# Patient Record
Sex: MALE | Race: WHITE | NOT HISPANIC OR LATINO | URBAN - METROPOLITAN AREA
[De-identification: names, ages, dates, MRNs, and addresses within clinical notes are randomized per-mention and may not be internally consistent; named-entity substitution may affect disease eponyms.]

---

## 2017-07-25 ENCOUNTER — OFFICE (OUTPATIENT)
Dept: URBAN - METROPOLITAN AREA CLINIC 33 | Facility: CLINIC | Age: 62
End: 2017-07-25

## 2017-07-25 VITALS
SYSTOLIC BLOOD PRESSURE: 142 MMHG | DIASTOLIC BLOOD PRESSURE: 84 MMHG | WEIGHT: 220 LBS | TEMPERATURE: 97.2 F | HEIGHT: 71 IN | HEART RATE: 62 BPM

## 2017-07-25 DIAGNOSIS — K51.00 ULCERATIVE (CHRONIC) PANCOLITIS WITHOUT COMPLICATIONS: ICD-10-CM

## 2017-07-25 DIAGNOSIS — Z79.899 OTHER LONG TERM (CURRENT) DRUG THERAPY: ICD-10-CM

## 2017-07-25 PROCEDURE — 99213 OFFICE O/P EST LOW 20 MIN: CPT

## 2017-07-25 PROCEDURE — 00031: CPT

## 2017-11-15 ENCOUNTER — ON CAMPUS - OUTPATIENT (OUTPATIENT)
Dept: URBAN - METROPOLITAN AREA HOSPITAL 63 | Facility: HOSPITAL | Age: 62
End: 2017-11-15
Payer: OTHER GOVERNMENT

## 2017-11-15 DIAGNOSIS — K63.5 POLYP OF COLON: ICD-10-CM

## 2017-11-15 DIAGNOSIS — Z87.19 PERSONAL HISTORY OF OTHER DISEASES OF THE DIGESTIVE SYSTEM: ICD-10-CM

## 2017-11-15 PROCEDURE — 45380 COLONOSCOPY AND BIOPSY: CPT

## 2018-01-31 ENCOUNTER — OFFICE (OUTPATIENT)
Dept: URBAN - METROPOLITAN AREA CLINIC 33 | Facility: CLINIC | Age: 63
End: 2018-01-31

## 2018-01-31 VITALS
WEIGHT: 229 LBS | HEART RATE: 46 BPM | HEIGHT: 71 IN | TEMPERATURE: 97.8 F | DIASTOLIC BLOOD PRESSURE: 75 MMHG | SYSTOLIC BLOOD PRESSURE: 121 MMHG

## 2018-01-31 DIAGNOSIS — K51.00 ULCERATIVE (CHRONIC) PANCOLITIS WITHOUT COMPLICATIONS: ICD-10-CM

## 2018-01-31 PROCEDURE — 99213 OFFICE O/P EST LOW 20 MIN: CPT

## 2019-04-11 ENCOUNTER — OFFICE (OUTPATIENT)
Dept: URBAN - METROPOLITAN AREA CLINIC 33 | Facility: CLINIC | Age: 64
End: 2019-04-11
Payer: OTHER GOVERNMENT

## 2019-04-11 VITALS
SYSTOLIC BLOOD PRESSURE: 135 MMHG | DIASTOLIC BLOOD PRESSURE: 85 MMHG | WEIGHT: 223 LBS | HEIGHT: 71 IN | HEART RATE: 53 BPM | TEMPERATURE: 97.9 F

## 2019-04-11 DIAGNOSIS — K51.00 ULCERATIVE (CHRONIC) PANCOLITIS WITHOUT COMPLICATIONS: ICD-10-CM

## 2019-04-11 DIAGNOSIS — Z79.899 OTHER LONG TERM (CURRENT) DRUG THERAPY: ICD-10-CM

## 2019-04-11 PROCEDURE — 99213 OFFICE O/P EST LOW 20 MIN: CPT

## 2020-04-23 ENCOUNTER — OFFICE (OUTPATIENT)
Dept: URBAN - METROPOLITAN AREA CLINIC 34 | Facility: CLINIC | Age: 65
End: 2020-04-23
Payer: OTHER GOVERNMENT

## 2020-04-23 VITALS — HEIGHT: 71 IN | WEIGHT: 210 LBS

## 2020-04-23 DIAGNOSIS — Z79.899 OTHER LONG TERM (CURRENT) DRUG THERAPY: ICD-10-CM

## 2020-04-23 DIAGNOSIS — K51.00 ULCERATIVE (CHRONIC) PANCOLITIS WITHOUT COMPLICATIONS: ICD-10-CM

## 2020-04-23 PROCEDURE — 99213 OFFICE O/P EST LOW 20 MIN: CPT | Mod: 95 | Performed by: INTERNAL MEDICINE

## 2020-04-23 NOTE — SERVICEHPINOTES
PATIENT VERIFIED BY DATE OF BIRTH AND NAME. Patient has been consented for this telecommunication visit.Pt returns for routine follow up of UC. He continues to do quite well and continues on lialda 2 capsules per day. On this regimen he usually has 1-2 BM/day. Normal consistency, sometimes on the softer side. No blood in stool itself, rarely sees blood with wiping due to known hemorrhoids. No mucous in stools. No urgency or tenesmus. No abdominal pain or cramping. No nocturnal BMs. No unusual or unexplained rashes, low back pain, arthritic complaints, or eye complaints. Colonoscopy 11/2017 showed 2 non-adenomatous polyps, mild diverticulosis, and internal hemorrhoids. The colonic mucosa was otherwise normal appearing, and random colon biopsies were all normal. Last colonoscopy prior to 11/2017 (in 2014) showed pseudopolyps and diverticulosis surveillance biopsies confirmed pseudopolyps but no active colitis. Routine labs with PCP have been normal.He recently quit smoking (on his birthday) and is somewhat nervous as last time he quit he had a substantial flare. Otherwise, he feels well and has no complaints or issues. ROS as follows:BR  Pertinent negative symptoms include eye irritation, sneezing, palpitation/fluttering of heart, shortness of breath while exercising, blurred vision, irritation from light, painful eyes, constipation, diarrhea, pain, reflux (heartburn), rectal bleeding, night sweats, weight loss, dry skin, itchy skin, rash, fainting, ringing in ears, cough, wheezing.

## 2021-02-03 ENCOUNTER — CONSULT (OUTPATIENT)
Dept: CARDIOLOGY | Facility: CLINIC | Age: 66
End: 2021-02-03

## 2021-02-03 VITALS
BODY MASS INDEX: 30.38 KG/M2 | SYSTOLIC BLOOD PRESSURE: 102 MMHG | WEIGHT: 217 LBS | HEART RATE: 53 BPM | HEIGHT: 71 IN | DIASTOLIC BLOOD PRESSURE: 60 MMHG

## 2021-02-03 DIAGNOSIS — I10 ESSENTIAL HYPERTENSION: ICD-10-CM

## 2021-02-03 DIAGNOSIS — R06.83 SNORING: ICD-10-CM

## 2021-02-03 DIAGNOSIS — E66.9 OBESITY (BMI 30-39.9): ICD-10-CM

## 2021-02-03 DIAGNOSIS — I48.0 PAF (PAROXYSMAL ATRIAL FIBRILLATION) (HCC): Primary | ICD-10-CM

## 2021-02-03 DIAGNOSIS — E78.49 OTHER HYPERLIPIDEMIA: ICD-10-CM

## 2021-02-03 PROBLEM — K51.811 OTHER ULCERATIVE COLITIS WITH RECTAL BLEEDING (HCC): Status: ACTIVE | Noted: 2021-02-03

## 2021-02-03 PROCEDURE — 99204 OFFICE O/P NEW MOD 45 MIN: CPT | Performed by: INTERNAL MEDICINE

## 2021-02-03 PROCEDURE — 93000 ELECTROCARDIOGRAM COMPLETE: CPT | Performed by: INTERNAL MEDICINE

## 2021-02-03 RX ORDER — ERGOCALCIFEROL 1.25 MG/1
50000 CAPSULE ORAL 2 TIMES WEEKLY
COMMUNITY
End: 2023-01-12

## 2021-02-03 RX ORDER — MULTIVIT WITH MINERALS/LUTEIN
1000 TABLET ORAL DAILY
COMMUNITY

## 2021-02-03 RX ORDER — FOLIC ACID 1 MG/1
1 TABLET ORAL DAILY
COMMUNITY

## 2021-02-03 RX ORDER — ROSUVASTATIN CALCIUM 10 MG/1
10 TABLET, COATED ORAL NIGHTLY
COMMUNITY

## 2021-02-03 RX ORDER — NEBIVOLOL 5 MG/1
5 TABLET ORAL NIGHTLY
COMMUNITY
End: 2021-11-01

## 2021-02-03 RX ORDER — NEBIVOLOL 20 MG/1
20 TABLET ORAL EVERY MORNING
Status: ON HOLD | COMMUNITY
End: 2021-09-23

## 2021-02-03 RX ORDER — MESALAMINE 1.2 G/1
1200 TABLET, DELAYED RELEASE ORAL 4 TIMES DAILY
Status: ON HOLD | COMMUNITY
End: 2021-09-23

## 2021-02-03 RX ORDER — MONTELUKAST SODIUM 10 MG/1
10 TABLET ORAL NIGHTLY
COMMUNITY
End: 2023-01-12

## 2021-02-03 RX ORDER — LOSARTAN POTASSIUM 100 MG/1
100 TABLET ORAL DAILY
COMMUNITY
End: 2021-09-25 | Stop reason: HOSPADM

## 2021-02-03 RX ORDER — PYRIDOXINE HCL (VITAMIN B6) 50 MG
50 TABLET ORAL DAILY
COMMUNITY

## 2021-02-03 NOTE — PROGRESS NOTES
[facility] Cardiology    Subjective:     Encounter Date:02/03/2021     Patient ID: Oskar Jimenes is a 65 y.o. male.  Referring provider: Self Referring     Reason for consultation:   Chief Complaint   Patient presents with   • Atrial Fibrillation   • Irregular Heart Beat        PROBLEM LIST:  1. Atrial fibrillation  a. Diagnosed 2018 after TIA.   b. Cardiolite stress test 5/10/18: no ischemia, EF 63%. OSH.  c. Echo 5/3/18: EF 60%, mild LVH, LA mildly dilated. No sig valvular disease. Dilated IVC. OSH.   d. CHADSVASC = 4, on Xarelto.   2. Hypertension  3. Hyperlipidemia  4. CVA  a. Carotid artery duplex 2018: No sig stenosis bilaterally.   b. MR Brain 2018: two foci of acute focal cortical infarcts on the right side  5. Ulcerative colitis  a. Diagnosed around 2005.   b. Followed by Dr. José Miguel Corbin   6. Former tobacco use  a. Quit 4/2020.      HPI:  Oskar Jimenes is a 65 y.o. male who is seen in consultation today at the request of Self Referring for PAF. He was following with cardiology annually. He moved from [place] this past fall and is looking to establish with cardiology here. He was diagnosed with Afib after TIA/CVA in 2018. He was started on Xarelto and Bystolic. He has done well on this. He has been paroxsymal. Symptoms are skipped beats. Otherwise, he is asymptomatic. Denies fatigue, chest pain, shortness of breath.      He has never been diagnosed with sleep apnea. He snores and has apneic spells per his wife.     He is followed by Dr. Corbin (GI) for UC. He had a c-scope last week which was essentially benign, biopsies pending. Continues to report hematochezia.     Medications and Allergies:    No Known Allergies      Current Outpatient Medications:   •  ascorbic acid (VITAMIN C) 1000 MG tablet, Take 1,000 mg by mouth Daily., Disp: , Rfl:   •  folic acid (FOLVITE) 1 MG tablet, Take 1 mg by mouth Daily., Disp: , Rfl:   •  losartan (COZAAR) 100 MG tablet, Take 100 mg by  mouth Daily., Disp: , Rfl:   •  Magnesium 70 MG capsule, Take 70 mg by mouth Daily., Disp: , Rfl:   •  mesalamine (LIALDA) 1.2 g EC tablet, Take 1,200 mg by mouth 4 (Four) Times a Day., Disp: , Rfl:   •  metFORMIN (GLUCOPHAGE) 1000 MG tablet, Take 1,000 mg by mouth Daily With Breakfast., Disp: , Rfl:   •  montelukast (SINGULAIR) 10 MG tablet, Take 10 mg by mouth Every Night., Disp: , Rfl:   •  nebivolol (BYSTOLIC) 20 MG tablet, Take 20 mg by mouth Daily., Disp: , Rfl:   •  nebivolol (BYSTOLIC) 5 MG tablet, Take 5 mg by mouth Daily., Disp: , Rfl:   •  pyridoxine (VITAMIN B-6) 50 MG tablet, Take 50 mg by mouth Daily., Disp: , Rfl:   •  rivaroxaban (XARELTO) 20 MG tablet, Take 20 mg by mouth Daily., Disp: , Rfl:   •  rosuvastatin (CRESTOR) 10 MG tablet, Take 10 mg by mouth Daily., Disp: , Rfl:   •  thiamine (VITAMIN B-1) 100 MG tablet  tablet, Take 50 mg by mouth Daily., Disp: , Rfl:   •  vitamin D (ERGOCALCIFEROL) 1.25 MG (33455 UT) capsule capsule, Take 50,000 Units by mouth 2 (Two) Times a Week., Disp: , Rfl:     Social History:  Social History     Tobacco Use   • Smoking status: Former Smoker     Packs/day: 0.50     Types: Cigarettes     Quit date: 2020     Years since quittin.7   • Smokeless tobacco: Never Used   Substance Use Topics   • Alcohol use: Yes     Alcohol/week: 2.0 - 3.0 standard drinks     Types: 2 - 3 Cans of beer per week     Frequency: Never     Drinks per session: 1 or 2        Family History:  family history includes Heart disease in his father; Heart failure in his father and mother; Kidney failure in his father.     Review of Systems: The following portions of the patient's history were reviewed and updated as appropriate: allergies, current medications and problem list.    Constitution: Negative for chills, fatigue, fever, and generalized weakness.   Cardiovascular: See HPI  Respiratory: See HPI  HENT: Negative for ear pain, nosebleeds, and tinnitus.  Gastrointestinal: Negative for  abdominal pain, constipation, diarrhea, nausea and vomiting.   Genitourinary: No urinary symptoms.   Musculoskeletal: Negative for muscle cramps.  Neurological: Negative for dizziness, headaches, loss of balance, numbness, and symptoms of stroke.  Psychiatric: Negative for depression, anxiety.         Objective:     Vitals:    02/03/21 0940   BP: 102/60   Pulse: 53       GENERAL: This is a well-developed, well-nourished, male who is in no acute distress.   SKIN: Pink and warm without rash or abnormality noted.   HEENT: Head is normocephalic and atraumatic. Pupils are equal and reactive to light bilaterally. Mucous membranes are pink and moist.   NECK: Supple without lymphadenopathy or thyromegaly. There is no jugular venous distention at 30°.  LUNGS: Clear to auscultation bilaterally without wheezing, rhonchi, or rales noted.   CARDIOVASCULAR: The heart has an irregularly irregular rhythm, bradycardic. Normal S1 and S2. There is no murmur, gallop, rub, or click appreciated. The PMI is nondisplaced. Carotid upstrokes are 2+ and symmetrical without bruits.   ABDOMEN: Soft and nondistended with positive bowel sounds x4. The patient admits lower abdominal discomfort on palpitation.   MUSCULOSKELETAL: There are no obvious bony abnormalities. Normal range of tenderness to palpation.   NEUROLOGICAL: Nonfocal. Alert and oriented x3.   PERIPHERAL VASCULAR: Femoral pulses are 2+ and symmetrical without bruits. Posterior tibial and dorsalis pedis pulses are 2+ and symmetrical. There is no peripheral edema.   PSYCH: Normal mood and affect.       ECG 12 Lead    Date/Time: 2/3/2021 10:11 AM  Performed by: Christina Mcgrath APRN  Authorized by: Christina Mcgrath APRN   Comparison: compared with previous ECG   Rhythm: atrial fibrillation  BPM: 53    Clinical impression: abnormal EKG                ASSESSMENT       ICD-10-CM ICD-9-CM   1. PAF (paroxysmal atrial fibrillation) ([facility])  I48.0 427.31   2. Essential  hypertension  I10 401.9   3. Other hyperlipidemia  E78.49 272.4   4. Obesity (BMI 30-39.9)  E66.9 278.00   5. Snoring  R06.83 786.09            PLAN     1. Continue Xarelto for stroke prevention  2. Continue Bystolic for rate-control. Discussed option of rhythm control with patient but he favors rate control since he is asymptomatic in Afib.   3. Continue Bystolic, losartan for HTN.   4. Continue Crestor for HLD.   5. Sleep study for snoring.  6. Discussed Watchman Device with patient given his history of UC, hematochezia. Patient will keep this in mind and discuss with his gastroenterologist.   7. Patient was counseled to begin aerobic exercise 30 min per day for at least 4 days per week.   8. Follow-up in 6 months, sooner as needed.      Scribed for Caitlyn Cardenas MD by ARCHANA Mcgrath, ELADIA. 2/3/2021  10:11 EST     I Caitlyn Cardenas MD personally performed the services described in this documentation as scribed by the above individual in my presence, and it is both accurate and complete.    Caitlyn Cardenas MD, FACC

## 2021-09-22 ENCOUNTER — APPOINTMENT (OUTPATIENT)
Dept: CT IMAGING | Facility: HOSPITAL | Age: 66
End: 2021-09-22

## 2021-09-22 ENCOUNTER — HOSPITAL ENCOUNTER (INPATIENT)
Facility: HOSPITAL | Age: 66
LOS: 3 days | Discharge: HOME | End: 2021-09-25
Attending: EMERGENCY MEDICINE | Admitting: INTERNAL MEDICINE

## 2021-09-22 DIAGNOSIS — K51.911 ULCERATIVE COLITIS WITH RECTAL BLEEDING, UNSPECIFIED LOCATION (HCC): ICD-10-CM

## 2021-09-22 DIAGNOSIS — K51.811 OTHER ULCERATIVE COLITIS WITH RECTAL BLEEDING (HCC): ICD-10-CM

## 2021-09-22 DIAGNOSIS — D64.9 SYMPTOMATIC ANEMIA: Primary | ICD-10-CM

## 2021-09-22 LAB
ABO GROUP BLD: NORMAL
ABO GROUP BLD: NORMAL
ALBUMIN SERPL-MCNC: 2.9 G/DL (ref 3.5–5.2)
ALBUMIN/GLOB SERPL: 1.1 G/DL
ALP SERPL-CCNC: 47 U/L (ref 39–117)
ALT SERPL W P-5'-P-CCNC: 23 U/L (ref 1–41)
ANION GAP SERPL CALCULATED.3IONS-SCNC: 12 MMOL/L (ref 5–15)
APTT PPP: 28.1 SECONDS (ref 22–39)
AST SERPL-CCNC: 15 U/L (ref 1–40)
BACTERIA UR QL AUTO: ABNORMAL /HPF
BASOPHILS # BLD MANUAL: 0 10*3/MM3 (ref 0–0.2)
BASOPHILS NFR BLD AUTO: 0 % (ref 0–1.5)
BILIRUB SERPL-MCNC: 0.2 MG/DL (ref 0–1.2)
BILIRUB UR QL STRIP: NEGATIVE
BLD GP AB SCN SERPL QL: NEGATIVE
BUN SERPL-MCNC: 12 MG/DL (ref 8–23)
BUN/CREAT SERPL: 11.3 (ref 7–25)
CALCIUM SPEC-SCNC: 8.2 MG/DL (ref 8.6–10.5)
CHLORIDE SERPL-SCNC: 101 MMOL/L (ref 98–107)
CLARITY UR: CLEAR
CO2 SERPL-SCNC: 23 MMOL/L (ref 22–29)
COLOR UR: YELLOW
CREAT SERPL-MCNC: 1.06 MG/DL (ref 0.76–1.27)
CRP SERPL-MCNC: 2.22 MG/DL (ref 0–0.5)
DEPRECATED RDW RBC AUTO: 53.7 FL (ref 37–54)
DEVELOPER EXPIRATION DATE: ABNORMAL
DEVELOPER LOT NUMBER: ABNORMAL
EOSINOPHIL # BLD MANUAL: 0.09 10*3/MM3 (ref 0–0.4)
EOSINOPHIL NFR BLD MANUAL: 1 % (ref 0.3–6.2)
ERYTHROCYTE [DISTWIDTH] IN BLOOD BY AUTOMATED COUNT: 15.2 % (ref 12.3–15.4)
ERYTHROCYTE [SEDIMENTATION RATE] IN BLOOD: 18 MM/HR (ref 0–20)
EXPIRATION DATE: ABNORMAL
FECAL OCCULT BLOOD SCREEN, POC: POSITIVE
FERRITIN SERPL-MCNC: 37.43 NG/ML (ref 30–400)
GFR SERPL CREATININE-BSD FRML MDRD: 70 ML/MIN/1.73
GLOBULIN UR ELPH-MCNC: 2.7 GM/DL
GLUCOSE SERPL-MCNC: 114 MG/DL (ref 65–99)
GLUCOSE UR STRIP-MCNC: NEGATIVE MG/DL
HCT VFR BLD AUTO: 18.1 % (ref 37.5–51)
HGB BLD-MCNC: 5.6 G/DL (ref 13–17.7)
HGB UR QL STRIP.AUTO: NEGATIVE
HYALINE CASTS UR QL AUTO: ABNORMAL /LPF
INR PPP: 1.48 (ref 0.85–1.16)
IRON 24H UR-MRATE: 12 MCG/DL (ref 59–158)
IRON SATN MFR SERPL: 4 % (ref 20–50)
KETONES UR QL STRIP: ABNORMAL
LEUKOCYTE ESTERASE UR QL STRIP.AUTO: ABNORMAL
LYMPHOCYTES # BLD MANUAL: 0.94 10*3/MM3 (ref 0.7–3.1)
LYMPHOCYTES NFR BLD MANUAL: 10 % (ref 19.6–45.3)
LYMPHOCYTES NFR BLD MANUAL: 5 % (ref 5–12)
Lab: ABNORMAL
MAGNESIUM SERPL-MCNC: 1.7 MG/DL (ref 1.6–2.4)
MCH RBC QN AUTO: 30.6 PG (ref 26.6–33)
MCHC RBC AUTO-ENTMCNC: 30.9 G/DL (ref 31.5–35.7)
MCV RBC AUTO: 98.9 FL (ref 79–97)
METAMYELOCYTES NFR BLD MANUAL: 1 % (ref 0–0)
MONOCYTES # BLD AUTO: 0.47 10*3/MM3 (ref 0.1–0.9)
NEGATIVE CONTROL: NEGATIVE
NEUTROPHILS # BLD AUTO: 7.83 10*3/MM3 (ref 1.7–7)
NEUTROPHILS NFR BLD MANUAL: 50 % (ref 42.7–76)
NEUTS BAND NFR BLD MANUAL: 33 % (ref 0–5)
NEUTS VAC BLD QL SMEAR: ABNORMAL
NITRITE UR QL STRIP: NEGATIVE
NRBC SPEC MANUAL: 1 /100 WBC (ref 0–0.2)
PH UR STRIP.AUTO: 5.5 [PH] (ref 5–8)
PLAT MORPH BLD: NORMAL
PLATELET # BLD AUTO: 610 10*3/MM3 (ref 140–450)
PMV BLD AUTO: 8.3 FL (ref 6–12)
POSITIVE CONTROL: POSITIVE
POTASSIUM SERPL-SCNC: 4 MMOL/L (ref 3.5–5.2)
PROCALCITONIN SERPL-MCNC: 0.11 NG/ML (ref 0–0.25)
PROT SERPL-MCNC: 5.6 G/DL (ref 6–8.5)
PROT UR QL STRIP: ABNORMAL
PROTHROMBIN TIME: 17.4 SECONDS (ref 11.4–14.4)
RBC # BLD AUTO: 1.83 10*6/MM3 (ref 4.14–5.8)
RBC # UR: ABNORMAL /HPF
RBC MORPH BLD: NORMAL
REF LAB TEST METHOD: ABNORMAL
RETICS # AUTO: 0.1 10*6/MM3 (ref 0.02–0.13)
RETICS/RBC NFR AUTO: 5.73 % (ref 0.7–1.9)
RH BLD: NEGATIVE
RH BLD: NEGATIVE
SARS-COV-2 RNA RESP QL NAA+PROBE: NOT DETECTED
SODIUM SERPL-SCNC: 136 MMOL/L (ref 136–145)
SP GR UR STRIP: 1.02 (ref 1–1.03)
SQUAMOUS #/AREA URNS HPF: ABNORMAL /HPF
T&S EXPIRATION DATE: NORMAL
TIBC SERPL-MCNC: 277 MCG/DL (ref 298–536)
TOXIC GRANULATION: ABNORMAL
TRANSFERRIN SERPL-MCNC: 186 MG/DL (ref 200–360)
UROBILINOGEN UR QL STRIP: ABNORMAL
WBC # BLD AUTO: 9.43 10*3/MM3 (ref 3.4–10.8)
WBC UR QL AUTO: ABNORMAL /HPF

## 2021-09-22 PROCEDURE — 86901 BLOOD TYPING SEROLOGIC RH(D): CPT | Performed by: EMERGENCY MEDICINE

## 2021-09-22 PROCEDURE — 85610 PROTHROMBIN TIME: CPT | Performed by: EMERGENCY MEDICINE

## 2021-09-22 PROCEDURE — 84466 ASSAY OF TRANSFERRIN: CPT | Performed by: INTERNAL MEDICINE

## 2021-09-22 PROCEDURE — 85652 RBC SED RATE AUTOMATED: CPT | Performed by: INTERNAL MEDICINE

## 2021-09-22 PROCEDURE — 82270 OCCULT BLOOD FECES: CPT | Performed by: EMERGENCY MEDICINE

## 2021-09-22 PROCEDURE — 84145 PROCALCITONIN (PCT): CPT | Performed by: INTERNAL MEDICINE

## 2021-09-22 PROCEDURE — 85018 HEMOGLOBIN: CPT | Performed by: INTERNAL MEDICINE

## 2021-09-22 PROCEDURE — 86900 BLOOD TYPING SEROLOGIC ABO: CPT | Performed by: EMERGENCY MEDICINE

## 2021-09-22 PROCEDURE — 85014 HEMATOCRIT: CPT | Performed by: INTERNAL MEDICINE

## 2021-09-22 PROCEDURE — 86923 COMPATIBILITY TEST ELECTRIC: CPT

## 2021-09-22 PROCEDURE — 86140 C-REACTIVE PROTEIN: CPT | Performed by: INTERNAL MEDICINE

## 2021-09-22 PROCEDURE — 85730 THROMBOPLASTIN TIME PARTIAL: CPT | Performed by: EMERGENCY MEDICINE

## 2021-09-22 PROCEDURE — 82728 ASSAY OF FERRITIN: CPT | Performed by: INTERNAL MEDICINE

## 2021-09-22 PROCEDURE — 86850 RBC ANTIBODY SCREEN: CPT | Performed by: EMERGENCY MEDICINE

## 2021-09-22 PROCEDURE — U0003 INFECTIOUS AGENT DETECTION BY NUCLEIC ACID (DNA OR RNA); SEVERE ACUTE RESPIRATORY SYNDROME CORONAVIRUS 2 (SARS-COV-2) (CORONAVIRUS DISEASE [COVID-19]), AMPLIFIED PROBE TECHNIQUE, MAKING USE OF HIGH THROUGHPUT TECHNOLOGIES AS DESCRIBED BY CMS-2020-01-R: HCPCS | Performed by: INTERNAL MEDICINE

## 2021-09-22 PROCEDURE — 85045 AUTOMATED RETICULOCYTE COUNT: CPT | Performed by: INTERNAL MEDICINE

## 2021-09-22 PROCEDURE — U0005 INFEC AGEN DETEC AMPLI PROBE: HCPCS | Performed by: INTERNAL MEDICINE

## 2021-09-22 PROCEDURE — 86901 BLOOD TYPING SEROLOGIC RH(D): CPT

## 2021-09-22 PROCEDURE — 80053 COMPREHEN METABOLIC PANEL: CPT | Performed by: EMERGENCY MEDICINE

## 2021-09-22 PROCEDURE — 83540 ASSAY OF IRON: CPT | Performed by: INTERNAL MEDICINE

## 2021-09-22 PROCEDURE — 99284 EMERGENCY DEPT VISIT MOD MDM: CPT

## 2021-09-22 PROCEDURE — 86900 BLOOD TYPING SEROLOGIC ABO: CPT

## 2021-09-22 PROCEDURE — 74177 CT ABD & PELVIS W/CONTRAST: CPT

## 2021-09-22 PROCEDURE — 99223 1ST HOSP IP/OBS HIGH 75: CPT | Performed by: INTERNAL MEDICINE

## 2021-09-22 PROCEDURE — 85025 COMPLETE CBC W/AUTO DIFF WBC: CPT | Performed by: EMERGENCY MEDICINE

## 2021-09-22 PROCEDURE — 83735 ASSAY OF MAGNESIUM: CPT | Performed by: INTERNAL MEDICINE

## 2021-09-22 PROCEDURE — 81001 URINALYSIS AUTO W/SCOPE: CPT | Performed by: EMERGENCY MEDICINE

## 2021-09-22 PROCEDURE — P9016 RBC LEUKOCYTES REDUCED: HCPCS

## 2021-09-22 PROCEDURE — 36430 TRANSFUSION BLD/BLD COMPNT: CPT

## 2021-09-22 PROCEDURE — 93005 ELECTROCARDIOGRAM TRACING: CPT | Performed by: INTERNAL MEDICINE

## 2021-09-22 PROCEDURE — 85007 BL SMEAR W/DIFF WBC COUNT: CPT | Performed by: EMERGENCY MEDICINE

## 2021-09-22 RX ORDER — PANTOPRAZOLE SODIUM 40 MG/10ML
40 INJECTION, POWDER, LYOPHILIZED, FOR SOLUTION INTRAVENOUS EVERY 12 HOURS SCHEDULED
Status: DISCONTINUED | OUTPATIENT
Start: 2021-09-22 | End: 2021-09-24

## 2021-09-22 RX ORDER — ACETAMINOPHEN 160 MG/5ML
650 SOLUTION ORAL EVERY 4 HOURS PRN
Status: DISCONTINUED | OUTPATIENT
Start: 2021-09-22 | End: 2021-09-25 | Stop reason: HOSPADM

## 2021-09-22 RX ORDER — SODIUM CHLORIDE 0.9 % (FLUSH) 0.9 %
10 SYRINGE (ML) INJECTION AS NEEDED
Status: DISCONTINUED | OUTPATIENT
Start: 2021-09-22 | End: 2021-09-25 | Stop reason: HOSPADM

## 2021-09-22 RX ORDER — SODIUM CHLORIDE, SODIUM LACTATE, POTASSIUM CHLORIDE, CALCIUM CHLORIDE 600; 310; 30; 20 MG/100ML; MG/100ML; MG/100ML; MG/100ML
75 INJECTION, SOLUTION INTRAVENOUS CONTINUOUS
Status: ACTIVE | OUTPATIENT
Start: 2021-09-22 | End: 2021-09-23

## 2021-09-22 RX ORDER — MONTELUKAST SODIUM 10 MG/1
10 TABLET ORAL NIGHTLY
Status: DISCONTINUED | OUTPATIENT
Start: 2021-09-22 | End: 2021-09-25 | Stop reason: HOSPADM

## 2021-09-22 RX ORDER — ACETAMINOPHEN 650 MG/1
650 SUPPOSITORY RECTAL EVERY 4 HOURS PRN
Status: DISCONTINUED | OUTPATIENT
Start: 2021-09-22 | End: 2021-09-25 | Stop reason: HOSPADM

## 2021-09-22 RX ORDER — MESALAMINE 1.2 G/1
1.2 TABLET, DELAYED RELEASE ORAL 4 TIMES DAILY
Status: DISCONTINUED | OUTPATIENT
Start: 2021-09-22 | End: 2021-09-24

## 2021-09-22 RX ORDER — FOLIC ACID 1 MG/1
1 TABLET ORAL DAILY
Status: DISCONTINUED | OUTPATIENT
Start: 2021-09-23 | End: 2021-09-25 | Stop reason: HOSPADM

## 2021-09-22 RX ORDER — ACETAMINOPHEN 325 MG/1
650 TABLET ORAL EVERY 4 HOURS PRN
Status: DISCONTINUED | OUTPATIENT
Start: 2021-09-22 | End: 2021-09-25 | Stop reason: HOSPADM

## 2021-09-22 RX ORDER — SODIUM CHLORIDE 0.9 % (FLUSH) 0.9 %
10 SYRINGE (ML) INJECTION EVERY 12 HOURS SCHEDULED
Status: DISCONTINUED | OUTPATIENT
Start: 2021-09-22 | End: 2021-09-25 | Stop reason: HOSPADM

## 2021-09-22 RX ORDER — LANOLIN ALCOHOL/MO/W.PET/CERES
50 CREAM (GRAM) TOPICAL DAILY
Status: DISCONTINUED | OUTPATIENT
Start: 2021-09-23 | End: 2021-09-25 | Stop reason: HOSPADM

## 2021-09-22 RX ORDER — ASCORBIC ACID 500 MG
1000 TABLET ORAL DAILY
Status: DISCONTINUED | OUTPATIENT
Start: 2021-09-23 | End: 2021-09-25 | Stop reason: HOSPADM

## 2021-09-22 RX ORDER — METHYLPREDNISOLONE SODIUM SUCCINATE 40 MG/ML
20 INJECTION, POWDER, LYOPHILIZED, FOR SOLUTION INTRAMUSCULAR; INTRAVENOUS EVERY 8 HOURS SCHEDULED
Status: DISCONTINUED | OUTPATIENT
Start: 2021-09-22 | End: 2021-09-23

## 2021-09-22 RX ORDER — SACCHAROMYCES BOULARDII 250 MG
250 CAPSULE ORAL 2 TIMES DAILY
Status: DISCONTINUED | OUTPATIENT
Start: 2021-09-22 | End: 2021-09-25 | Stop reason: HOSPADM

## 2021-09-22 RX ORDER — ONDANSETRON 2 MG/ML
4 INJECTION INTRAMUSCULAR; INTRAVENOUS EVERY 6 HOURS PRN
Status: DISCONTINUED | OUTPATIENT
Start: 2021-09-22 | End: 2021-09-25 | Stop reason: HOSPADM

## 2021-09-22 RX ORDER — MORPHINE SULFATE 4 MG/ML
3 INJECTION, SOLUTION INTRAMUSCULAR; INTRAVENOUS
Status: DISCONTINUED | OUTPATIENT
Start: 2021-09-22 | End: 2021-09-25 | Stop reason: HOSPADM

## 2021-09-22 RX ORDER — CHOLECALCIFEROL (VITAMIN D3) 125 MCG
5 CAPSULE ORAL NIGHTLY PRN
Status: DISCONTINUED | OUTPATIENT
Start: 2021-09-22 | End: 2021-09-25 | Stop reason: HOSPADM

## 2021-09-22 RX ORDER — ROSUVASTATIN CALCIUM 10 MG/1
10 TABLET, COATED ORAL DAILY
Status: DISCONTINUED | OUTPATIENT
Start: 2021-09-23 | End: 2021-09-25 | Stop reason: HOSPADM

## 2021-09-22 RX ADMIN — IOPAMIDOL 100 ML: 612 INJECTION, SOLUTION INTRAVENOUS at 23:31

## 2021-09-22 RX ADMIN — SODIUM CHLORIDE 500 ML: 9 INJECTION, SOLUTION INTRAVENOUS at 18:38

## 2021-09-22 RX ADMIN — MONTELUKAST SODIUM 10 MG: 10 TABLET, COATED ORAL at 22:55

## 2021-09-22 RX ADMIN — Medication 250 MG: at 22:55

## 2021-09-23 LAB
ADV 40+41 DNA STL QL NAA+NON-PROBE: NOT DETECTED
ANION GAP SERPL CALCULATED.3IONS-SCNC: 11 MMOL/L (ref 5–15)
ASTRO TYP 1-8 RNA STL QL NAA+NON-PROBE: NOT DETECTED
BASOPHILS # BLD AUTO: 0.03 10*3/MM3 (ref 0–0.2)
BASOPHILS NFR BLD AUTO: 0.3 % (ref 0–1.5)
BH BB BLOOD EXPIRATION DATE: NORMAL
BH BB BLOOD EXPIRATION DATE: NORMAL
BH BB BLOOD TYPE BARCODE: 600
BH BB BLOOD TYPE BARCODE: 9500
BH BB DISPENSE STATUS: NORMAL
BH BB DISPENSE STATUS: NORMAL
BH BB PRODUCT CODE: NORMAL
BH BB PRODUCT CODE: NORMAL
BH BB UNIT NUMBER: NORMAL
BH BB UNIT NUMBER: NORMAL
BUN SERPL-MCNC: 13 MG/DL (ref 8–23)
BUN/CREAT SERPL: 14 (ref 7–25)
C CAYETANENSIS DNA STL QL NAA+NON-PROBE: NOT DETECTED
C COLI+JEJ+UPSA DNA STL QL NAA+NON-PROBE: NOT DETECTED
CALCIUM SPEC-SCNC: 7.7 MG/DL (ref 8.6–10.5)
CHLORIDE SERPL-SCNC: 103 MMOL/L (ref 98–107)
CO2 SERPL-SCNC: 21 MMOL/L (ref 22–29)
CREAT SERPL-MCNC: 0.93 MG/DL (ref 0.76–1.27)
CROSSMATCH INTERPRETATION: NORMAL
CROSSMATCH INTERPRETATION: NORMAL
CRYPTOSP DNA STL QL NAA+NON-PROBE: NOT DETECTED
DEPRECATED RDW RBC AUTO: 56.5 FL (ref 37–54)
E HISTOLYT DNA STL QL NAA+NON-PROBE: NOT DETECTED
EAEC PAA PLAS AGGR+AATA ST NAA+NON-PRB: NOT DETECTED
EC STX1+STX2 GENES STL QL NAA+NON-PROBE: NOT DETECTED
EOSINOPHIL # BLD AUTO: 0 10*3/MM3 (ref 0–0.4)
EOSINOPHIL NFR BLD AUTO: 0 % (ref 0.3–6.2)
EPEC EAE GENE STL QL NAA+NON-PROBE: NOT DETECTED
ERYTHROCYTE [DISTWIDTH] IN BLOOD BY AUTOMATED COUNT: 16.7 % (ref 12.3–15.4)
ETEC LTA+ST1A+ST1B TOX ST NAA+NON-PROBE: NOT DETECTED
FOLATE SERPL-MCNC: >20 NG/ML (ref 4.78–24.2)
G LAMBLIA DNA STL QL NAA+NON-PROBE: NOT DETECTED
GFR SERPL CREATININE-BSD FRML MDRD: 81 ML/MIN/1.73
GLUCOSE SERPL-MCNC: 131 MG/DL (ref 65–99)
HBV SURFACE AB SER RIA-ACNC: REACTIVE
HCT VFR BLD AUTO: 21.1 % (ref 37.5–51)
HCT VFR BLD AUTO: 22.2 % (ref 37.5–51)
HCT VFR BLD AUTO: 25.9 % (ref 37.5–51)
HCT VFR BLD AUTO: 28.5 % (ref 37.5–51)
HGB BLD-MCNC: 6.3 G/DL (ref 13–17.7)
HGB BLD-MCNC: 7.1 G/DL (ref 13–17.7)
HGB BLD-MCNC: 8.2 G/DL (ref 13–17.7)
HGB BLD-MCNC: 8.8 G/DL (ref 13–17.7)
IMM GRANULOCYTES # BLD AUTO: 0.16 10*3/MM3 (ref 0–0.05)
IMM GRANULOCYTES NFR BLD AUTO: 1.5 % (ref 0–0.5)
LYMPHOCYTES # BLD AUTO: 0.66 10*3/MM3 (ref 0.7–3.1)
LYMPHOCYTES NFR BLD AUTO: 6.3 % (ref 19.6–45.3)
MAGNESIUM SERPL-MCNC: 1.7 MG/DL (ref 1.6–2.4)
MCH RBC QN AUTO: 30.5 PG (ref 26.6–33)
MCHC RBC AUTO-ENTMCNC: 32 G/DL (ref 31.5–35.7)
MCV RBC AUTO: 95.3 FL (ref 79–97)
MONOCYTES # BLD AUTO: 0.43 10*3/MM3 (ref 0.1–0.9)
MONOCYTES NFR BLD AUTO: 4.1 % (ref 5–12)
NEUTROPHILS NFR BLD AUTO: 87.8 % (ref 42.7–76)
NEUTROPHILS NFR BLD AUTO: 9.17 10*3/MM3 (ref 1.7–7)
NOROVIRUS GI+II RNA STL QL NAA+NON-PROBE: NOT DETECTED
NRBC BLD AUTO-RTO: 0.6 /100 WBC (ref 0–0.2)
P SHIGELLOIDES DNA STL QL NAA+NON-PROBE: NOT DETECTED
PLAT MORPH BLD: NORMAL
PLATELET # BLD AUTO: 553 10*3/MM3 (ref 140–450)
PMV BLD AUTO: 8.4 FL (ref 6–12)
POTASSIUM SERPL-SCNC: 4.6 MMOL/L (ref 3.5–5.2)
RBC # BLD AUTO: 2.33 10*6/MM3 (ref 4.14–5.8)
RBC MORPH BLD: NORMAL
RVA RNA STL QL NAA+NON-PROBE: NOT DETECTED
S ENT+BONG DNA STL QL NAA+NON-PROBE: NOT DETECTED
SAPO I+II+IV+V RNA STL QL NAA+NON-PROBE: NOT DETECTED
SHIGELLA SP+EIEC IPAH ST NAA+NON-PROBE: NOT DETECTED
SODIUM SERPL-SCNC: 135 MMOL/L (ref 136–145)
UNIT  ABO: NORMAL
UNIT  ABO: NORMAL
UNIT  RH: NORMAL
UNIT  RH: NORMAL
V CHOL+PARA+VUL DNA STL QL NAA+NON-PROBE: NOT DETECTED
V CHOLERAE DNA STL QL NAA+NON-PROBE: NOT DETECTED
VIT B12 BLD-MCNC: 1285 PG/ML (ref 211–946)
WBC # BLD AUTO: 10.45 10*3/MM3 (ref 3.4–10.8)
WBC MORPH BLD: NORMAL
Y ENTEROCOL DNA STL QL NAA+NON-PROBE: NOT DETECTED

## 2021-09-23 PROCEDURE — 86900 BLOOD TYPING SEROLOGIC ABO: CPT

## 2021-09-23 PROCEDURE — 83735 ASSAY OF MAGNESIUM: CPT | Performed by: INTERNAL MEDICINE

## 2021-09-23 PROCEDURE — 82607 VITAMIN B-12: CPT | Performed by: INTERNAL MEDICINE

## 2021-09-23 PROCEDURE — P9016 RBC LEUKOCYTES REDUCED: HCPCS

## 2021-09-23 PROCEDURE — 25010000002 IOPAMIDOL 61 % SOLUTION: Performed by: INTERNAL MEDICINE

## 2021-09-23 PROCEDURE — 85014 HEMATOCRIT: CPT | Performed by: INTERNAL MEDICINE

## 2021-09-23 PROCEDURE — 87385 HISTOPLASMA CAPSUL AG IA: CPT | Performed by: NURSE PRACTITIONER

## 2021-09-23 PROCEDURE — 86706 HEP B SURFACE ANTIBODY: CPT | Performed by: NURSE PRACTITIONER

## 2021-09-23 PROCEDURE — 80048 BASIC METABOLIC PNL TOTAL CA: CPT | Performed by: INTERNAL MEDICINE

## 2021-09-23 PROCEDURE — 25010000002 METHYLPREDNISOLONE PER 40 MG: Performed by: INTERNAL MEDICINE

## 2021-09-23 PROCEDURE — 25010000003 HYDROCORTISONE SOD SUCCINATE PF 250 MG RECONSTITUTED SOLUTION 1 EACH VIAL: Performed by: NURSE PRACTITIONER

## 2021-09-23 PROCEDURE — 36430 TRANSFUSION BLD/BLD COMPNT: CPT

## 2021-09-23 PROCEDURE — 99233 SBSQ HOSP IP/OBS HIGH 50: CPT | Performed by: HOSPITALIST

## 2021-09-23 PROCEDURE — 85018 HEMOGLOBIN: CPT | Performed by: INTERNAL MEDICINE

## 2021-09-23 PROCEDURE — 93010 ELECTROCARDIOGRAM REPORT: CPT | Performed by: INTERNAL MEDICINE

## 2021-09-23 PROCEDURE — 25010000002 PIPERACILLIN SOD-TAZOBACTAM PER 1 G: Performed by: INTERNAL MEDICINE

## 2021-09-23 PROCEDURE — 82746 ASSAY OF FOLIC ACID SERUM: CPT | Performed by: INTERNAL MEDICINE

## 2021-09-23 PROCEDURE — 99223 1ST HOSP IP/OBS HIGH 75: CPT | Performed by: NURSE PRACTITIONER

## 2021-09-23 PROCEDURE — 25010000003 MAGNESIUM SULFATE 4 GM/100ML SOLUTION: Performed by: NURSE PRACTITIONER

## 2021-09-23 PROCEDURE — 86481 TB AG RESPONSE T-CELL SUSP: CPT | Performed by: NURSE PRACTITIONER

## 2021-09-23 PROCEDURE — 25010000002 NA FERRIC GLUC CPLX PER 12.5 MG: Performed by: NURSE PRACTITIONER

## 2021-09-23 PROCEDURE — 0097U HC BIOFIRE FILMARRAY GI PANEL: CPT | Performed by: NURSE PRACTITIONER

## 2021-09-23 PROCEDURE — 85025 COMPLETE CBC W/AUTO DIFF WBC: CPT | Performed by: INTERNAL MEDICINE

## 2021-09-23 PROCEDURE — 85007 BL SMEAR W/DIFF WBC COUNT: CPT | Performed by: INTERNAL MEDICINE

## 2021-09-23 RX ORDER — POTASSIUM CHLORIDE 1.5 G/1.77G
40 POWDER, FOR SOLUTION ORAL AS NEEDED
Status: DISCONTINUED | OUTPATIENT
Start: 2021-09-23 | End: 2021-09-25 | Stop reason: HOSPADM

## 2021-09-23 RX ORDER — MAGNESIUM SULFATE HEPTAHYDRATE 40 MG/ML
4 INJECTION, SOLUTION INTRAVENOUS AS NEEDED
Status: DISCONTINUED | OUTPATIENT
Start: 2021-09-23 | End: 2021-09-25 | Stop reason: HOSPADM

## 2021-09-23 RX ORDER — MAGNESIUM SULFATE HEPTAHYDRATE 40 MG/ML
2 INJECTION, SOLUTION INTRAVENOUS AS NEEDED
Status: DISCONTINUED | OUTPATIENT
Start: 2021-09-23 | End: 2021-09-25 | Stop reason: HOSPADM

## 2021-09-23 RX ORDER — POTASSIUM CHLORIDE 7.45 MG/ML
10 INJECTION INTRAVENOUS
Status: DISCONTINUED | OUTPATIENT
Start: 2021-09-23 | End: 2021-09-25 | Stop reason: HOSPADM

## 2021-09-23 RX ORDER — FERROUS SULFATE 325(65) MG
325 TABLET ORAL
COMMUNITY
End: 2021-09-25 | Stop reason: HOSPADM

## 2021-09-23 RX ORDER — UBIDECARENONE 75 MG
500 CAPSULE ORAL DAILY
COMMUNITY

## 2021-09-23 RX ORDER — MERCAPTOPURINE 50 MG/1
50 TABLET ORAL DAILY
COMMUNITY

## 2021-09-23 RX ORDER — FLUTICASONE PROPIONATE 50 MCG
2 SPRAY, SUSPENSION (ML) NASAL DAILY
COMMUNITY
End: 2023-01-12

## 2021-09-23 RX ORDER — POTASSIUM CHLORIDE 750 MG/1
40 CAPSULE, EXTENDED RELEASE ORAL AS NEEDED
Status: DISCONTINUED | OUTPATIENT
Start: 2021-09-23 | End: 2021-09-25 | Stop reason: HOSPADM

## 2021-09-23 RX ORDER — PREDNISONE 10 MG/1
10 TABLET ORAL
COMMUNITY
End: 2021-09-25 | Stop reason: HOSPADM

## 2021-09-23 RX ADMIN — TAZOBACTAM SODIUM AND PIPERACILLIN SODIUM 3.38 G: 375; 3 INJECTION, SOLUTION INTRAVENOUS at 00:03

## 2021-09-23 RX ADMIN — Medication 250 MG: at 08:18

## 2021-09-23 RX ADMIN — Medication 250 MG: at 20:39

## 2021-09-23 RX ADMIN — SODIUM CHLORIDE 250 MG: 9 INJECTION, SOLUTION INTRAVENOUS at 11:26

## 2021-09-23 RX ADMIN — PANTOPRAZOLE SODIUM 40 MG: 40 INJECTION, POWDER, FOR SOLUTION INTRAVENOUS at 00:31

## 2021-09-23 RX ADMIN — PANTOPRAZOLE SODIUM 40 MG: 40 INJECTION, POWDER, FOR SOLUTION INTRAVENOUS at 20:38

## 2021-09-23 RX ADMIN — FOLIC ACID 1 MG: 1 TABLET ORAL at 08:20

## 2021-09-23 RX ADMIN — TAZOBACTAM SODIUM AND PIPERACILLIN SODIUM 3.38 G: 375; 3 INJECTION, SOLUTION INTRAVENOUS at 05:43

## 2021-09-23 RX ADMIN — THIAMINE HCL TAB 100 MG 50 MG: 100 TAB at 08:18

## 2021-09-23 RX ADMIN — ROSUVASTATIN CALCIUM 10 MG: 10 TABLET, COATED ORAL at 08:18

## 2021-09-23 RX ADMIN — MONTELUKAST SODIUM 10 MG: 10 TABLET, COATED ORAL at 20:39

## 2021-09-23 RX ADMIN — METHYLPREDNISOLONE SODIUM SUCCINATE 20 MG: 40 INJECTION, POWDER, FOR SOLUTION INTRAMUSCULAR; INTRAVENOUS at 00:31

## 2021-09-23 RX ADMIN — PYRIDOXINE HCL TAB 50 MG 50 MG: 50 TAB at 08:18

## 2021-09-23 RX ADMIN — OXYCODONE HYDROCHLORIDE AND ACETAMINOPHEN 1000 MG: 500 TABLET ORAL at 08:18

## 2021-09-23 RX ADMIN — TAZOBACTAM SODIUM AND PIPERACILLIN SODIUM 3.38 G: 375; 3 INJECTION, SOLUTION INTRAVENOUS at 13:22

## 2021-09-23 RX ADMIN — METHYLPREDNISOLONE SODIUM SUCCINATE 20 MG: 40 INJECTION, POWDER, FOR SOLUTION INTRAMUSCULAR; INTRAVENOUS at 05:44

## 2021-09-23 RX ADMIN — TAZOBACTAM SODIUM AND PIPERACILLIN SODIUM 3.38 G: 375; 3 INJECTION, SOLUTION INTRAVENOUS at 20:39

## 2021-09-23 RX ADMIN — SODIUM CHLORIDE 250 MG: 9 INJECTION, SOLUTION INTRAVENOUS at 11:45

## 2021-09-23 RX ADMIN — MAGNESIUM SULFATE HEPTAHYDRATE 4 G: 40 INJECTION, SOLUTION INTRAVENOUS at 07:30

## 2021-09-23 RX ADMIN — PANTOPRAZOLE SODIUM 40 MG: 40 INJECTION, POWDER, FOR SOLUTION INTRAVENOUS at 08:20

## 2021-09-23 RX ADMIN — SODIUM CHLORIDE, POTASSIUM CHLORIDE, SODIUM LACTATE AND CALCIUM CHLORIDE 75 ML/HR: 600; 310; 30; 20 INJECTION, SOLUTION INTRAVENOUS at 00:34

## 2021-09-24 LAB
ALBUMIN SERPL-MCNC: 2.8 G/DL (ref 3.5–5.2)
ALBUMIN/GLOB SERPL: 1 G/DL
ALP SERPL-CCNC: 48 U/L (ref 39–117)
ALT SERPL W P-5'-P-CCNC: 21 U/L (ref 1–41)
ANION GAP SERPL CALCULATED.3IONS-SCNC: 13 MMOL/L (ref 5–15)
AST SERPL-CCNC: 15 U/L (ref 1–40)
BASOPHILS # BLD AUTO: 0.01 10*3/MM3 (ref 0–0.2)
BASOPHILS NFR BLD AUTO: 0.2 % (ref 0–1.5)
BH BB BLOOD EXPIRATION DATE: NORMAL
BH BB BLOOD TYPE BARCODE: 2800
BH BB DISPENSE STATUS: NORMAL
BH BB PRODUCT CODE: NORMAL
BH BB UNIT NUMBER: NORMAL
BILIRUB SERPL-MCNC: 0.5 MG/DL (ref 0–1.2)
BUN SERPL-MCNC: 11 MG/DL (ref 8–23)
BUN/CREAT SERPL: 11 (ref 7–25)
CALCIUM SPEC-SCNC: 7.8 MG/DL (ref 8.6–10.5)
CHLORIDE SERPL-SCNC: 100 MMOL/L (ref 98–107)
CO2 SERPL-SCNC: 19 MMOL/L (ref 22–29)
CREAT SERPL-MCNC: 1 MG/DL (ref 0.76–1.27)
CROSSMATCH INTERPRETATION: NORMAL
DEPRECATED RDW RBC AUTO: 59.4 FL (ref 37–54)
EOSINOPHIL # BLD AUTO: 0 10*3/MM3 (ref 0–0.4)
EOSINOPHIL NFR BLD AUTO: 0 % (ref 0.3–6.2)
ERYTHROCYTE [DISTWIDTH] IN BLOOD BY AUTOMATED COUNT: 16.9 % (ref 12.3–15.4)
GFR SERPL CREATININE-BSD FRML MDRD: 75 ML/MIN/1.73
GLOBULIN UR ELPH-MCNC: 2.9 GM/DL
GLUCOSE SERPL-MCNC: 137 MG/DL (ref 65–99)
HBV SURFACE AG SERPL QL IA: NORMAL
HCT VFR BLD AUTO: 27.7 % (ref 37.5–51)
HGB BLD-MCNC: 8.6 G/DL (ref 13–17.7)
IMM GRANULOCYTES # BLD AUTO: 0.1 10*3/MM3 (ref 0–0.05)
IMM GRANULOCYTES NFR BLD AUTO: 1.6 % (ref 0–0.5)
LYMPHOCYTES # BLD AUTO: 0.76 10*3/MM3 (ref 0.7–3.1)
LYMPHOCYTES NFR BLD AUTO: 11.9 % (ref 19.6–45.3)
MAGNESIUM SERPL-MCNC: 2.2 MG/DL (ref 1.6–2.4)
MCH RBC QN AUTO: 30.4 PG (ref 26.6–33)
MCHC RBC AUTO-ENTMCNC: 31 G/DL (ref 31.5–35.7)
MCV RBC AUTO: 97.9 FL (ref 79–97)
MONOCYTES # BLD AUTO: 0.5 10*3/MM3 (ref 0.1–0.9)
MONOCYTES NFR BLD AUTO: 7.8 % (ref 5–12)
NEUTROPHILS NFR BLD AUTO: 5 10*3/MM3 (ref 1.7–7)
NEUTROPHILS NFR BLD AUTO: 78.5 % (ref 42.7–76)
NRBC BLD AUTO-RTO: 1.4 /100 WBC (ref 0–0.2)
PLATELET # BLD AUTO: 566 10*3/MM3 (ref 140–450)
PMV BLD AUTO: 8.3 FL (ref 6–12)
POTASSIUM SERPL-SCNC: 4.2 MMOL/L (ref 3.5–5.2)
PROT SERPL-MCNC: 5.7 G/DL (ref 6–8.5)
RBC # BLD AUTO: 2.83 10*6/MM3 (ref 4.14–5.8)
SODIUM SERPL-SCNC: 132 MMOL/L (ref 136–145)
UNIT  ABO: NORMAL
UNIT  RH: NORMAL
WBC # BLD AUTO: 6.37 10*3/MM3 (ref 3.4–10.8)

## 2021-09-24 PROCEDURE — 83735 ASSAY OF MAGNESIUM: CPT | Performed by: INTERNAL MEDICINE

## 2021-09-24 PROCEDURE — 25010000002 PIPERACILLIN SOD-TAZOBACTAM PER 1 G: Performed by: INTERNAL MEDICINE

## 2021-09-24 PROCEDURE — 25010000002 NA FERRIC GLUC CPLX PER 12.5 MG: Performed by: NURSE PRACTITIONER

## 2021-09-24 PROCEDURE — 63710000001 PREDNISONE PER 1 MG: Performed by: PHYSICIAN ASSISTANT

## 2021-09-24 PROCEDURE — 87340 HEPATITIS B SURFACE AG IA: CPT | Performed by: PHYSICIAN ASSISTANT

## 2021-09-24 PROCEDURE — 80053 COMPREHEN METABOLIC PANEL: CPT | Performed by: HOSPITALIST

## 2021-09-24 PROCEDURE — 85025 COMPLETE CBC W/AUTO DIFF WBC: CPT | Performed by: INTERNAL MEDICINE

## 2021-09-24 PROCEDURE — 99232 SBSQ HOSP IP/OBS MODERATE 35: CPT | Performed by: PHYSICIAN ASSISTANT

## 2021-09-24 PROCEDURE — 99233 SBSQ HOSP IP/OBS HIGH 50: CPT | Performed by: HOSPITALIST

## 2021-09-24 RX ORDER — HYDROCORTISONE 25 MG/G
CREAM TOPICAL 2 TIMES DAILY
Status: DISCONTINUED | OUTPATIENT
Start: 2021-09-24 | End: 2021-09-25 | Stop reason: HOSPADM

## 2021-09-24 RX ORDER — PANTOPRAZOLE SODIUM 40 MG/1
40 TABLET, DELAYED RELEASE ORAL
Status: DISCONTINUED | OUTPATIENT
Start: 2021-09-25 | End: 2021-09-25 | Stop reason: HOSPADM

## 2021-09-24 RX ORDER — NEBIVOLOL 20 MG/1
20 TABLET ORAL EVERY MORNING
COMMUNITY
End: 2021-09-25 | Stop reason: HOSPADM

## 2021-09-24 RX ORDER — PREDNISONE 20 MG/1
40 TABLET ORAL
Status: DISCONTINUED | OUTPATIENT
Start: 2021-09-24 | End: 2021-09-25 | Stop reason: HOSPADM

## 2021-09-24 RX ADMIN — ROSUVASTATIN CALCIUM 10 MG: 10 TABLET, COATED ORAL at 08:32

## 2021-09-24 RX ADMIN — TAZOBACTAM SODIUM AND PIPERACILLIN SODIUM 3.38 G: 375; 3 INJECTION, SOLUTION INTRAVENOUS at 21:53

## 2021-09-24 RX ADMIN — SODIUM CHLORIDE 250 MG: 9 INJECTION, SOLUTION INTRAVENOUS at 08:30

## 2021-09-24 RX ADMIN — TAZOBACTAM SODIUM AND PIPERACILLIN SODIUM 3.38 G: 375; 3 INJECTION, SOLUTION INTRAVENOUS at 12:15

## 2021-09-24 RX ADMIN — FOLIC ACID 1 MG: 1 TABLET ORAL at 08:31

## 2021-09-24 RX ADMIN — HYDROCORTISONE: 25 CREAM TOPICAL at 10:19

## 2021-09-24 RX ADMIN — TAZOBACTAM SODIUM AND PIPERACILLIN SODIUM 3.38 G: 375; 3 INJECTION, SOLUTION INTRAVENOUS at 04:11

## 2021-09-24 RX ADMIN — OXYCODONE HYDROCHLORIDE AND ACETAMINOPHEN 1000 MG: 500 TABLET ORAL at 08:31

## 2021-09-24 RX ADMIN — PYRIDOXINE HCL TAB 50 MG 50 MG: 50 TAB at 08:32

## 2021-09-24 RX ADMIN — HYDROCORTISONE: 25 CREAM TOPICAL at 21:53

## 2021-09-24 RX ADMIN — THIAMINE HCL TAB 100 MG 50 MG: 100 TAB at 08:31

## 2021-09-24 RX ADMIN — MONTELUKAST SODIUM 10 MG: 10 TABLET, COATED ORAL at 21:53

## 2021-09-24 RX ADMIN — Medication 250 MG: at 08:32

## 2021-09-24 RX ADMIN — PANTOPRAZOLE SODIUM 40 MG: 40 INJECTION, POWDER, FOR SOLUTION INTRAVENOUS at 08:31

## 2021-09-24 RX ADMIN — Medication 250 MG: at 21:53

## 2021-09-24 RX ADMIN — RIVAROXABAN 20 MG: 20 TABLET, FILM COATED ORAL at 17:10

## 2021-09-24 RX ADMIN — PREDNISONE 40 MG: 20 TABLET ORAL at 15:06

## 2021-09-24 RX ADMIN — MAGNESIUM OXIDE 400 MG ORAL TABLET 200 MG: 241.3 TABLET ORAL at 08:31

## 2021-09-25 VITALS
HEART RATE: 61 BPM | SYSTOLIC BLOOD PRESSURE: 98 MMHG | BODY MASS INDEX: 31.22 KG/M2 | DIASTOLIC BLOOD PRESSURE: 55 MMHG | HEIGHT: 71 IN | TEMPERATURE: 97.9 F | OXYGEN SATURATION: 97 % | RESPIRATION RATE: 16 BRPM | WEIGHT: 223 LBS

## 2021-09-25 LAB
ANION GAP SERPL CALCULATED.3IONS-SCNC: 9 MMOL/L (ref 5–15)
BASOPHILS # BLD AUTO: 0.02 10*3/MM3 (ref 0–0.2)
BASOPHILS NFR BLD AUTO: 0.4 % (ref 0–1.5)
BUN SERPL-MCNC: 13 MG/DL (ref 8–23)
BUN/CREAT SERPL: 15.1 (ref 7–25)
CALCIUM SPEC-SCNC: 7.3 MG/DL (ref 8.6–10.5)
CHLORIDE SERPL-SCNC: 104 MMOL/L (ref 98–107)
CO2 SERPL-SCNC: 22 MMOL/L (ref 22–29)
CREAT SERPL-MCNC: 0.86 MG/DL (ref 0.76–1.27)
DEPRECATED RDW RBC AUTO: 57.3 FL (ref 37–54)
EOSINOPHIL # BLD AUTO: 0 10*3/MM3 (ref 0–0.4)
EOSINOPHIL NFR BLD AUTO: 0 % (ref 0.3–6.2)
ERYTHROCYTE [DISTWIDTH] IN BLOOD BY AUTOMATED COUNT: 16.5 % (ref 12.3–15.4)
GFR SERPL CREATININE-BSD FRML MDRD: 89 ML/MIN/1.73
GLUCOSE SERPL-MCNC: 139 MG/DL (ref 65–99)
HCT VFR BLD AUTO: 26.1 % (ref 37.5–51)
HGB BLD-MCNC: 8 G/DL (ref 13–17.7)
IMM GRANULOCYTES # BLD AUTO: 0.12 10*3/MM3 (ref 0–0.05)
IMM GRANULOCYTES NFR BLD AUTO: 2.4 % (ref 0–0.5)
LYMPHOCYTES # BLD AUTO: 0.93 10*3/MM3 (ref 0.7–3.1)
LYMPHOCYTES NFR BLD AUTO: 18.5 % (ref 19.6–45.3)
MCH RBC QN AUTO: 30.2 PG (ref 26.6–33)
MCHC RBC AUTO-ENTMCNC: 30.7 G/DL (ref 31.5–35.7)
MCV RBC AUTO: 98.5 FL (ref 79–97)
MONOCYTES # BLD AUTO: 0.51 10*3/MM3 (ref 0.1–0.9)
MONOCYTES NFR BLD AUTO: 10.1 % (ref 5–12)
NEUTROPHILS NFR BLD AUTO: 3.46 10*3/MM3 (ref 1.7–7)
NEUTROPHILS NFR BLD AUTO: 68.6 % (ref 42.7–76)
NRBC BLD AUTO-RTO: 1.6 /100 WBC (ref 0–0.2)
PLATELET # BLD AUTO: 487 10*3/MM3 (ref 140–450)
PMV BLD AUTO: 8.1 FL (ref 6–12)
POTASSIUM SERPL-SCNC: 3.9 MMOL/L (ref 3.5–5.2)
RBC # BLD AUTO: 2.65 10*6/MM3 (ref 4.14–5.8)
SODIUM SERPL-SCNC: 135 MMOL/L (ref 136–145)
TSPOT INTERPRETATION: NORMAL
TSPOT NIL CONTROL INTERPRETATION: NORMAL
TSPOT PANEL A: 0
TSPOT PANEL B: 0
TSPOT POS CONTROL INTERPRETATION: NORMAL
WBC # BLD AUTO: 5.04 10*3/MM3 (ref 3.4–10.8)

## 2021-09-25 PROCEDURE — 99239 HOSP IP/OBS DSCHRG MGMT >30: CPT | Performed by: INTERNAL MEDICINE

## 2021-09-25 PROCEDURE — 85025 COMPLETE CBC W/AUTO DIFF WBC: CPT | Performed by: INTERNAL MEDICINE

## 2021-09-25 PROCEDURE — 25010000002 PIPERACILLIN SOD-TAZOBACTAM PER 1 G: Performed by: INTERNAL MEDICINE

## 2021-09-25 PROCEDURE — 63710000001 PREDNISONE PER 1 MG: Performed by: PHYSICIAN ASSISTANT

## 2021-09-25 PROCEDURE — 25010000002 NA FERRIC GLUC CPLX PER 12.5 MG: Performed by: NURSE PRACTITIONER

## 2021-09-25 PROCEDURE — 80048 BASIC METABOLIC PNL TOTAL CA: CPT | Performed by: INTERNAL MEDICINE

## 2021-09-25 RX ORDER — IRON POLYSACCH/IRON HEME POLYP 28 MG
1 TABLET ORAL DAILY
Qty: 30 TABLET | Refills: 0 | Status: SHIPPED | OUTPATIENT
Start: 2021-09-25

## 2021-09-25 RX ORDER — PREDNISONE 20 MG/1
40 TABLET ORAL
Qty: 28 TABLET | Refills: 0 | Status: SHIPPED | OUTPATIENT
Start: 2021-09-26 | End: 2021-10-10

## 2021-09-25 RX ORDER — HYDROCORTISONE 25 MG/G
CREAM TOPICAL 2 TIMES DAILY
Qty: 28 G | Refills: 2 | Status: SHIPPED | OUTPATIENT
Start: 2021-09-25 | End: 2021-10-09

## 2021-09-25 RX ADMIN — HYDROCORTISONE: 25 CREAM TOPICAL at 08:56

## 2021-09-25 RX ADMIN — TAZOBACTAM SODIUM AND PIPERACILLIN SODIUM 3.38 G: 375; 3 INJECTION, SOLUTION INTRAVENOUS at 05:53

## 2021-09-25 RX ADMIN — Medication 250 MG: at 08:54

## 2021-09-25 RX ADMIN — FOLIC ACID 1 MG: 1 TABLET ORAL at 08:55

## 2021-09-25 RX ADMIN — THIAMINE HCL TAB 100 MG 50 MG: 100 TAB at 08:55

## 2021-09-25 RX ADMIN — MAGNESIUM OXIDE 400 MG ORAL TABLET 200 MG: 241.3 TABLET ORAL at 08:54

## 2021-09-25 RX ADMIN — PANTOPRAZOLE SODIUM 40 MG: 40 TABLET, DELAYED RELEASE ORAL at 05:53

## 2021-09-25 RX ADMIN — PYRIDOXINE HCL TAB 50 MG 50 MG: 50 TAB at 08:54

## 2021-09-25 RX ADMIN — ROSUVASTATIN CALCIUM 10 MG: 10 TABLET, COATED ORAL at 08:55

## 2021-09-25 RX ADMIN — PREDNISONE 40 MG: 20 TABLET ORAL at 08:54

## 2021-09-25 RX ADMIN — OXYCODONE HYDROCHLORIDE AND ACETAMINOPHEN 1000 MG: 500 TABLET ORAL at 08:55

## 2021-09-25 RX ADMIN — SODIUM CHLORIDE 250 MG: 9 INJECTION, SOLUTION INTRAVENOUS at 10:14

## 2021-09-27 LAB
H CAPSUL AG UR QL IA: <0.5
Lab: NORMAL
QT INTERVAL: 442 MS
QTC INTERVAL: 442 MS

## 2021-10-11 ENCOUNTER — TELEPHONE (OUTPATIENT)
Dept: CARDIOLOGY | Facility: CLINIC | Age: 66
End: 2021-10-11

## 2021-10-11 NOTE — TELEPHONE ENCOUNTER
Tried to call pt back per his request- pt stated in VM that this was an urgent matter and needed a return call ASAP.     I called but no answer and had to leave a VM-     PT was in the hospital for ulcerative colitis. Had to receive 3 units PRBC- restarted Xarelto at discharge on 9/25/2021- PT reported in the VM that he had been bleeding for 5 days and needed to know what to do.

## 2021-10-11 NOTE — TELEPHONE ENCOUNTER
Spoke with pt- he started bleeding this past Thursday- stopped Xarelto this morning. Dr. Corbin sent him for labs this morning but he has not heard the results yet.     Per Dr. Cardenas- hold Xarelto 1 week- restart after 1 week- monitor for bleeding. If reoccurs, stop immediately. Refer for evaluation for watchman- send message to Melania Ugalde.      D/w pt and her verbalized understanding. He will keep me updated

## 2021-10-12 ENCOUNTER — TELEPHONE (OUTPATIENT)
Dept: CARDIOLOGY | Facility: CLINIC | Age: 66
End: 2021-10-12

## 2021-10-12 DIAGNOSIS — K92.2 GASTRIC BLEED: ICD-10-CM

## 2021-10-12 DIAGNOSIS — I48.0 PAROXYSMAL ATRIAL FIBRILLATION (HCC): Primary | ICD-10-CM

## 2021-10-12 NOTE — TELEPHONE ENCOUNTER
Spoke with pt in detail about Watchman device.  Answered a few questions.  Watchman literature mailed to patient with my contact information.  Pt wishes to proceed with an EP consult.  Can you please coordinate?    Melania Ugalde RN       ----- Message from Loni Interiano RN sent at 10/11/2021  4:01 PM EDT -----  Theresa would like this pt evaluated for Watchman. Recently admitted to [facility] for bleed on Xarelto.

## 2021-10-18 NOTE — TELEPHONE ENCOUNTER
He is still having some episodes of bleeding- has been holding Xarelto 1 week now. Hold another week or go ahead and restart it. He did talk with Dr. Corbin and he thinks that he may have some breakthrough bleeding regardless if he is on Xarelto or not- pt just a little worried with starting it back right now.     He does have an appt with Dr. Bedolla 11/22- we had an earlier appt but he was not able to make that appt.

## 2021-10-20 NOTE — TELEPHONE ENCOUNTER
"Reviewed with Dr. Cardenas:  \"risk of stroke over next month is relatively low but still a risk @ 0.01% per day. We can continue to hold Xarelto but he definitely needs to get to appt on 11/22 with Dr. Bedolla\".      Relayed the information pt- he verbalized understanding. His appt with Dr. Bedolla was able to be moved up to 11/01/2021- he decided to take the appt after not taking it at first.    "

## 2021-10-28 PROBLEM — Z86.73 HISTORY OF CVA (CEREBROVASCULAR ACCIDENT): Status: ACTIVE | Noted: 2021-10-28

## 2021-10-28 PROBLEM — E78.2 MIXED HYPERLIPIDEMIA: Status: ACTIVE | Noted: 2021-02-03

## 2021-11-01 ENCOUNTER — OFFICE VISIT (OUTPATIENT)
Dept: CARDIOLOGY | Facility: CLINIC | Age: 66
End: 2021-11-01

## 2021-11-01 ENCOUNTER — PATIENT ROUNDING (BHMG ONLY) (OUTPATIENT)
Dept: CARDIOLOGY | Facility: CLINIC | Age: 66
End: 2021-11-01

## 2021-11-01 VITALS
HEART RATE: 79 BPM | OXYGEN SATURATION: 94 % | DIASTOLIC BLOOD PRESSURE: 58 MMHG | BODY MASS INDEX: 28.98 KG/M2 | HEIGHT: 71 IN | SYSTOLIC BLOOD PRESSURE: 122 MMHG | WEIGHT: 207 LBS

## 2021-11-01 DIAGNOSIS — G45.9 TIA (TRANSIENT ISCHEMIC ATTACK): ICD-10-CM

## 2021-11-01 DIAGNOSIS — I48.0 PAF (PAROXYSMAL ATRIAL FIBRILLATION) (HCC): Primary | ICD-10-CM

## 2021-11-01 DIAGNOSIS — G47.30 SLEEP APNEA, UNSPECIFIED TYPE: ICD-10-CM

## 2021-11-01 DIAGNOSIS — I10 ESSENTIAL HYPERTENSION: ICD-10-CM

## 2021-11-01 DIAGNOSIS — K51.811 OTHER ULCERATIVE COLITIS WITH RECTAL BLEEDING (HCC): ICD-10-CM

## 2021-11-01 PROBLEM — D64.9 SYMPTOMATIC ANEMIA: Status: RESOLVED | Noted: 2021-09-22 | Resolved: 2021-11-01

## 2021-11-01 PROCEDURE — 93000 ELECTROCARDIOGRAM COMPLETE: CPT | Performed by: INTERNAL MEDICINE

## 2021-11-01 PROCEDURE — 99204 OFFICE O/P NEW MOD 45 MIN: CPT | Performed by: INTERNAL MEDICINE

## 2021-11-01 RX ORDER — ADALIMUMAB 40MG/0.4ML
KIT SUBCUTANEOUS WEEKLY
COMMUNITY
Start: 2021-10-21 | End: 2023-01-12

## 2021-11-01 RX ORDER — NEBIVOLOL 5 MG/1
5 TABLET ORAL DAILY
COMMUNITY
End: 2022-02-04 | Stop reason: ALTCHOICE

## 2021-11-01 RX ORDER — PREDNISONE 10 MG/1
10 TABLET ORAL DAILY
COMMUNITY
End: 2022-03-28 | Stop reason: SINTOL

## 2021-11-01 RX ORDER — LANOLIN ALCOHOL/MO/W.PET/CERES
1 CREAM (GRAM) TOPICAL 2 TIMES DAILY
COMMUNITY

## 2021-11-01 RX ORDER — LOSARTAN POTASSIUM 100 MG/1
100 TABLET ORAL DAILY
COMMUNITY
End: 2022-02-04 | Stop reason: ALTCHOICE

## 2021-11-01 NOTE — PROGRESS NOTES
November 1, 2021    Hello, may I speak with Oskar Jimenes?    My name is Stephani MALONE      I am  with [facility]  [address]  [facility] [place] [address] [phone].    Before we get started may I verify your date of birth? 1955    I am calling to officially welcome you to our practice and ask about your recent visit. Is this a good time to talk? YES    Tell me about your visit with us. What things went well? Everything went well. All questions were answered.       We're always looking for ways to make our patients' experiences even better. Do you have recommendations on ways we may improve?  Parking is confusing.    Overall were you satisfied with your first visit to our practice? Yes       I appreciate you taking the time to speak with me today. Is there anything else I can do for you? No      Thank you, and have a great day.

## 2022-02-04 ENCOUNTER — TELEPHONE (OUTPATIENT)
Dept: CARDIOLOGY | Facility: CLINIC | Age: 67
End: 2022-02-04

## 2022-02-04 NOTE — TELEPHONE ENCOUNTER
Pt called to report BP running 120/70s off losartan/Bystolic. Since 9/2021 admission he has lost weight. GIB issues have resolved. He declines Watchman device at this time. Tolerating Xarelto. He had to reschedule his 2/9/22 appt due to conflicting appt. He is now scheduled for FU in 9/2022. He will call should issues arise for sooner appt or if BP should elevate consistently > 130/80.

## 2022-03-28 ENCOUNTER — OFFICE VISIT (OUTPATIENT)
Dept: CARDIOLOGY | Facility: CLINIC | Age: 67
End: 2022-03-28

## 2022-03-28 VITALS
DIASTOLIC BLOOD PRESSURE: 76 MMHG | WEIGHT: 186 LBS | HEART RATE: 82 BPM | OXYGEN SATURATION: 99 % | SYSTOLIC BLOOD PRESSURE: 134 MMHG | BODY MASS INDEX: 26.04 KG/M2 | HEIGHT: 71 IN

## 2022-03-28 DIAGNOSIS — I48.20 ATRIAL FIBRILLATION, CHRONIC: Primary | ICD-10-CM

## 2022-03-28 DIAGNOSIS — E78.2 MIXED HYPERLIPIDEMIA: ICD-10-CM

## 2022-03-28 DIAGNOSIS — I10 ESSENTIAL HYPERTENSION: ICD-10-CM

## 2022-03-28 DIAGNOSIS — K51.811 OTHER ULCERATIVE COLITIS WITH RECTAL BLEEDING: ICD-10-CM

## 2022-03-28 PROCEDURE — 99214 OFFICE O/P EST MOD 30 MIN: CPT | Performed by: NURSE PRACTITIONER

## 2022-03-28 RX ORDER — CETIRIZINE HYDROCHLORIDE 10 MG/1
1 TABLET ORAL DAILY
COMMUNITY
Start: 2022-03-22

## 2022-03-28 NOTE — PROGRESS NOTES
[facility] Card[facility]    Patient ID: Oskar Jimenes is a 66 y.o. male.  : 1955   Contact: [phone]    Encounter date: 2022    PCP: Catie Gutierrez DO      Chief complaint:   Chief Complaint   Patient presents with   • PAF (paroxysmal atrial fibrillation) ([facility])     PROBLEM LIST:  1. Persistent versus chronic atrial fibrillation  a. Diagnosed 2018 after TIA.   b. Cardiolite stress test 5/10/18: no ischemia, EF 63%. OSH.  c. Echo 5/3/18: EF 60%, mild LVH, LA mildly dilated. No sig valvular disease. Dilated IVC. OSH.   d. CHADSVASC = 4, on Xarelto.   2. Hypertension  3. Hyperlipidemia  4. CVA  a. Carotid artery duplex 2018: No sig stenosis bilaterally.   b. MR Brain 2018: two foci of acute focal cortical infarcts on the right side  5. Ulcerative colitis  a. Diagnosed around .   b. Followed by Dr. José Miguel Corbin   6. Former tobacco use  a. Quit 2020.    Allergies and Medications:  No Known Allergies    Current Medications:    Current Outpatient Medications:   •  ascorbic acid (VITAMIN C) 1000 MG tablet, Take 1,000 mg by mouth Daily., Disp: , Rfl:   •  Coenzyme Q10 (CO Q 10 PO), Take 300 mg by mouth Daily., Disp: , Rfl:   •  fluticasone (FLONASE) 50 MCG/ACT nasal spray, 2 sprays into the nostril(s) as directed by provider Daily., Disp: , Rfl:   •  folic acid (FOLVITE) 1 MG tablet, Take 1 mg by mouth Daily., Disp: , Rfl:   •  glucosamine-chondroitin 500-400 MG per tablet, Take 1 tablet by mouth 2 (Two) Times a Day., Disp: , Rfl:   •  Humira Pen 40 MG/0.4ML Pen-injector Kit, 1 (One) Time Per Week., Disp: , Rfl:   •  Magnesium 70 MG capsule, Take 70 mg by mouth Daily., Disp: , Rfl:   •  mercaptopurine (PURINETHOL) 50 MG chemo tablet, Take 50 mg by mouth Daily., Disp: , Rfl:   •  metFORMIN (GLUCOPHAGE) 1000 MG tablet, Take 1,000 mg by mouth Daily With Breakfast., Disp: , Rfl:   •  Misc Natural Products (GLUCOSAMINE CHOND CMP ADVANCED PO), Take  by mouth., Disp: ,  Rfl:   •  Polysacch Fe Cmp-Fe Heme Poly (Feosol Bifera) 28 MG tablet, Take 1 tablet by mouth Daily. (Patient taking differently: Take 2 tablets by mouth Daily.), Disp: 30 tablet, Rfl: 0  •  pyridoxine (VITAMIN B-6) 50 MG tablet, Take 50 mg by mouth Daily., Disp: , Rfl:   •  rivaroxaban (XARELTO) 20 MG tablet, Take 20 mg by mouth Daily., Disp: , Rfl:   •  rosuvastatin (CRESTOR) 10 MG tablet, Take 10 mg by mouth Every Night., Disp: , Rfl:   •  thiamine (VITAMIN B-1) 100 MG tablet  tablet, Take 50 mg by mouth Daily., Disp: , Rfl:   •  vitamin B-12 (CYANOCOBALAMIN) 100 MCG tablet, Take 500 mcg by mouth Daily., Disp: , Rfl:   •  vitamin D (ERGOCALCIFEROL) 1.25 MG (88342 UT) capsule capsule, Take 50,000 Units by mouth 2 (Two) Times a Week., Disp: , Rfl:   •  cetirizine (zyrTEC) 10 MG tablet, Take 1 tablet by mouth Daily., Disp: , Rfl:   •  montelukast (SINGULAIR) 10 MG tablet, Take 10 mg by mouth Every Night., Disp: , Rfl:     HPI    Oskar Jimenes is a 66 y.o. male who presents today for follow up on Afib, HTN, HLD. Since last visit, patient has done well from CV standpoint. He had bleeding from ulcerative colitis in September. Hgb dropped to 7. He required two blood transfusions with most recent in January or February. He has had some GIB intermittently for a long time. He was seen by Dr. Bedolla in November to discuss Watchman. He has decided to defer this for now as he prefers a non-invasive approach. He is hoping that his bleeding will improve as his UC is better managed. He is not on any rate controlling agents for Afib. He reports that his HR is usually < 100 bpm.  Patient has been in Afib since at least 2/2021.    The following portions of the patient's history were reviewed and updated as appropriate: allergies, current medications and problem list.    Pertinent positives as listed in the HPI.  All other systems reviewed are negative.         Vitals:    03/28/22 1530   BP: 134/76   BP Location: Left arm  "  Patient Position: Sitting   Cuff Size: Adult   Pulse: 82   SpO2: 99%   Weight: 84.4 kg (186 lb)   Height: 180.3 cm (71\")       Physical Exam:  General: Alert and oriented.  Neck: Jugular venous pressure is within normal limits. Carotids have normal upstrokes without bruits.   Cardiovascular: Heart has a nondisplaced focal PMI. Irregularly irregular rate and rhythm without murmur, gallop or rub.  Lungs: Clear without rales or wheezes. Equal expansion is noted.   Extremities: Show no edema.  Skin: Warm and dry.  Neurologic: Nonfocal.     Diagnostic Data:  Lab Results   Component Value Date    GLUCOSE 139 (H) 09/25/2021    BUN 13 09/25/2021    CREATININE 0.86 09/25/2021    EGFRIFNONA 89 09/25/2021    BCR 15.1 09/25/2021    K 3.9 09/25/2021    CO2 22.0 09/25/2021    CALCIUM 7.3 (L) 09/25/2021    ALBUMIN 2.80 (L) 09/24/2021    AST 15 09/24/2021    ALT 21 09/24/2021     Lab Results   Component Value Date    GLUCOSE 139 (H) 09/25/2021    CALCIUM 7.3 (L) 09/25/2021     (L) 09/25/2021    K 3.9 09/25/2021    CO2 22.0 09/25/2021     09/25/2021    BUN 13 09/25/2021    CREATININE 0.86 09/25/2021    EGFRIFNONA 89 09/25/2021    BCR 15.1 09/25/2021    ANIONGAP 9.0 09/25/2021     No results found for: CHOL, CHLPL, TRIG, HDL, LDL, LDLDIRECT  Lab Results   Component Value Date    WBC 5.04 09/25/2021    HGB 8.0 (L) 09/25/2021    HCT 26.1 (L) 09/25/2021    MCV 98.5 (H) 09/25/2021     (H) 09/25/2021     No results found for: TSH    Procedures      ASSESSMENT:    ICD-10-CM ICD-9-CM   1. Atrial fibrillation, chronic (HCC)  I48.20 427.31   2. Essential hypertension  I10 401.9   3. Mixed hyperlipidemia  E78.2 272.2   4. Other ulcerative colitis with rectal bleeding (HCC)  K51.811 556.8     Lab results found above were reviewed with the patient.      PLAN:  1. Continue Xarelto for stroke prevention/Afib with CHADSVASC =4. He has decided to defer Watchman device implant.   2. Patient has been off bisoprolol due to relative " hypotension. His chronic/persistent Afib has been rate-controlled.   3. Continue lifestyle modification for HTN, HLD.   4. Continue Crestor for HLD.  5. Continue all other current medications.  6. F/up in 8 months, sooner if needed.      Electronically signed by ELADIA Robb, 03/28/22, 3:43 PM EDT.

## 2023-01-11 NOTE — PROGRESS NOTES
[facility] Cardiology    Patient ID: Oskar Jimenes is a 67 y.o. male.  : 1955   Contact: [phone]    Encounter date: 2023    PCP: Keisha Smith DO      Chief complaint:   Chief Complaint   Patient presents with   • Atrial fibrillation, chronic (HCC)       Problem List:  1. Persistent versus chronic atrial fibrillation  a. Diagnosed 2018 after TIA.   b. Cardiolite stress test 5/10/18: no ischemia, EF 63%. OSH.  c. Echo 5/3/18: EF 60%, mild LVH, LA mildly dilated. No sig valvular disease. Dilated IVC. OSH.   d. CHADSVASC = 4, on Xarelto.   2. Hypertension  3. Hyperlipidemia  4. CVA  a. Carotid artery duplex 2018: No sig stenosis bilaterally.   b. MR Brain 2018: two foci of acute focal cortical infarcts on the right side  5. Ulcerative colitis  a. Diagnosed around .   b. Followed by Dr. José Miguel Corbin   6. Former tobacco use  a. Quit 2020.    No Known Allergies    Current Medications:    Current Outpatient Medications:   •  ascorbic acid (VITAMIN C) 1000 MG tablet, Take 1,000 mg by mouth Daily., Disp: , Rfl:   •  cetirizine (zyrTEC) 10 MG tablet, Take 1 tablet by mouth Daily., Disp: , Rfl:   •  Coenzyme Q10 (CO Q 10 PO), Take 300 mg by mouth Daily., Disp: , Rfl:   •  folic acid (FOLVITE) 1 MG tablet, Take 1 mg by mouth Daily., Disp: , Rfl:   •  glucosamine-chondroitin 500-400 MG per tablet, Take 1 tablet by mouth 2 (Two) Times a Day., Disp: , Rfl:   •  hydrocortisone 2.5 % cream, Apply 1 application topically to the appropriate area as directed As Needed., Disp: , Rfl:   •  Magnesium 70 MG capsule, Take 70 mg by mouth Daily., Disp: , Rfl:   •  mercaptopurine (PURINETHOL) 50 MG chemo tablet, Take 50 mg by mouth Daily., Disp: , Rfl:   •  metFORMIN (GLUCOPHAGE) 1000 MG tablet, Take 1,000 mg by mouth Daily With Breakfast., Disp: , Rfl:   •  Polysacch Fe Cmp-Fe Heme Poly (Feosol Bifera) 28 MG tablet, Take 1 tablet by mouth Daily. (Patient taking  "differently: Take 2 tablets by mouth Daily.), Disp: 30 tablet, Rfl: 0  •  pyridoxine (VITAMIN B-6) 50 MG tablet, Take 50 mg by mouth Daily., Disp: , Rfl:   •  rivaroxaban (XARELTO) 20 MG tablet, Take 20 mg by mouth Daily., Disp: , Rfl:   •  rosuvastatin (CRESTOR) 10 MG tablet, Take 10 mg by mouth Every Night., Disp: , Rfl:   •  thiamine (VITAMIN B-1) 100 MG tablet  tablet, Take 50 mg by mouth Daily., Disp: , Rfl:   •  vedolizumab (Entyvio) 300 MG injection, Infuse 300 mg into a venous catheter 1 (One) Time. Infusion every 8 weeks. Last dosage 12/30/2022., Disp: , Rfl:   •  vitamin B-12 (CYANOCOBALAMIN) 100 MCG tablet, Take 500 mcg by mouth Daily., Disp: , Rfl:     HPI    Oskar Jimenes is a 67 y.o. male who presents today for follow up on atrial fibrillation, hypertension, and hyperlipidemia. Since last visit, he shares that about one year ago, he ended up in the hospital for about four days due to ulcerative colitis. Patient does not take any antihypertensive medication now due to ulcerative colitis. He states his blood pressure is remaining stable. Patient maintains a stable activity level by attending the [facility] four to five days. Patient denies chest pain, shortness of breath, orthopnea, palpitations, edema, dizziness, and syncope.      The following portions of the patient's history were reviewed and updated as appropriate: allergies, current medications and problem list.    Pertinent positives as listed in the HPI.  All other systems reviewed are negative.         Vitals:    01/12/23 1041   BP: 130/76   BP Location: Left arm   Patient Position: Sitting   Cuff Size: Adult   Pulse: 56   SpO2: 97%   Weight: 93.9 kg (207 lb)   Height: 180.3 cm (71\")       Physical Exam:  General: Alert and oriented.  Neck: Jugular venous pressure is within normal limits. Carotids have normal upstrokes without bruits.   Cardiovascular: Heart has a nondisplaced focal PMI. Irregular rate and rhythm. No murmur, gallop or " rub.  Lungs: Clear, no rales or wheezes. Equal expansion is noted.   Extremities: Show trace edema.  Skin: Warm and dry.  Neurologic: Nonfocal.     Diagnostic Data (reviewed with patient):    Lab date: 09/23/2022  • CMP: Glu 100, BUN 15, Creat 0.84, eGFR 95, Na 136, K 4.5, Cl 99, CO2 25, Ca 9.4, Alk Phos 65, AST 20, ALT 17  • CBC: WBC 8.7, RBC 4.26, HGB 12.0, HCT 37.5, MCV 88, MCH 28.2,      Lab date: 08/22/2022  • FLP: , TG 51, HDL 57, LDL 63         ECG 12 Lead    Date/Time: 1/12/2023 10:47 AM  Performed by: Caitlyn Cardenas MD  Authorized by: Caitlyn Cardenas MD   Comparison: compared with previous ECG from 11/1/2022  Comparison to previous ECG: New anterior infarct, cannot rule out.   Rhythm: atrial fibrillation  Ectopy comments: slow ventricular response   BPM: 56    Clinical impression: abnormal EKG              Assessment:    ICD-10-CM ICD-9-CM   1. Atrial fibrillation, chronic (HCC)  I48.20 427.31   2. Essential hypertension  I10 401.9   3. Mixed hyperlipidemia  E78.2 272.2   4. Other ulcerative colitis with rectal bleeding (HCC)  K51.811 556.8         Plan:  1. Stable cardiac status.   2. Continue on Xarelto 20 mg daily for stroke prophylaxis.  3. Continue on rosuvastatin 10 mg nightly for hyperlipidemia.     4. Continue all other current medications.  5. F/up in 12 months, sooner if needed.      Scribed for Caitlyn Cardenas MD by Kristina Crawford. 1/12/2023 10:47 EST         I Caitlyn Cardenas MD personally performed the services described in this documentation as scribed by the above individual in my presence, and it is both accurate and complete.    Caitlyn Cardenas MD, [facility]

## 2023-01-12 ENCOUNTER — OFFICE VISIT (OUTPATIENT)
Dept: CARDIOLOGY | Facility: CLINIC | Age: 68
End: 2023-01-12
Payer: MEDICARE

## 2023-01-12 VITALS
HEART RATE: 56 BPM | BODY MASS INDEX: 28.98 KG/M2 | SYSTOLIC BLOOD PRESSURE: 130 MMHG | WEIGHT: 207 LBS | HEIGHT: 71 IN | OXYGEN SATURATION: 97 % | DIASTOLIC BLOOD PRESSURE: 76 MMHG

## 2023-01-12 DIAGNOSIS — E78.2 MIXED HYPERLIPIDEMIA: ICD-10-CM

## 2023-01-12 DIAGNOSIS — K51.811 OTHER ULCERATIVE COLITIS WITH RECTAL BLEEDING: ICD-10-CM

## 2023-01-12 DIAGNOSIS — I10 ESSENTIAL HYPERTENSION: ICD-10-CM

## 2023-01-12 DIAGNOSIS — I48.20 ATRIAL FIBRILLATION, CHRONIC: Primary | ICD-10-CM

## 2023-01-12 PROCEDURE — 93000 ELECTROCARDIOGRAM COMPLETE: CPT | Performed by: INTERNAL MEDICINE

## 2023-01-12 PROCEDURE — 99214 OFFICE O/P EST MOD 30 MIN: CPT | Performed by: INTERNAL MEDICINE

## 2023-01-12 RX ORDER — VEDOLIZUMAB 300 MG/5ML
300 INJECTION, POWDER, LYOPHILIZED, FOR SOLUTION INTRAVENOUS ONCE
COMMUNITY

## 2024-01-24 ENCOUNTER — OFFICE VISIT (OUTPATIENT)
Dept: CARDIOLOGY | Facility: CLINIC | Age: 69
End: 2024-01-24
Payer: MEDICARE

## 2024-01-24 VITALS
DIASTOLIC BLOOD PRESSURE: 78 MMHG | HEART RATE: 63 BPM | OXYGEN SATURATION: 98 % | BODY MASS INDEX: 32.11 KG/M2 | WEIGHT: 229.4 LBS | SYSTOLIC BLOOD PRESSURE: 136 MMHG | HEIGHT: 71 IN

## 2024-01-24 DIAGNOSIS — I48.20 ATRIAL FIBRILLATION, CHRONIC: Primary | ICD-10-CM

## 2024-01-24 DIAGNOSIS — I10 ESSENTIAL HYPERTENSION: ICD-10-CM

## 2024-01-24 DIAGNOSIS — E78.2 MIXED HYPERLIPIDEMIA: ICD-10-CM

## 2024-01-24 PROCEDURE — 99214 OFFICE O/P EST MOD 30 MIN: CPT | Performed by: INTERNAL MEDICINE

## 2024-01-24 PROCEDURE — 1159F MED LIST DOCD IN RCRD: CPT | Performed by: INTERNAL MEDICINE

## 2024-01-24 PROCEDURE — 3075F SYST BP GE 130 - 139MM HG: CPT | Performed by: INTERNAL MEDICINE

## 2024-01-24 PROCEDURE — 1160F RVW MEDS BY RX/DR IN RCRD: CPT | Performed by: INTERNAL MEDICINE

## 2024-01-24 PROCEDURE — 3078F DIAST BP <80 MM HG: CPT | Performed by: INTERNAL MEDICINE

## 2024-01-24 PROCEDURE — 93000 ELECTROCARDIOGRAM COMPLETE: CPT | Performed by: INTERNAL MEDICINE

## 2024-01-24 RX ORDER — FLUTICASONE PROPIONATE 50 MCG
1 SPRAY, SUSPENSION (ML) NASAL AS NEEDED
COMMUNITY
Start: 2024-01-15

## 2024-01-24 RX ORDER — LEVOCETIRIZINE DIHYDROCHLORIDE 5 MG/1
5 TABLET, FILM COATED ORAL
COMMUNITY
Start: 2024-01-20

## 2024-01-24 RX ORDER — IPRATROPIUM BROMIDE 42 UG/1
1 SPRAY, METERED NASAL AS NEEDED
COMMUNITY
Start: 2024-01-14

## 2024-01-24 NOTE — PROGRESS NOTES
[facility] Cardiology    Patient ID: Oskar Jimenes is a 68 y.o. male.  : 1955   Contact: [phone]    Encounter date: 2024    PCP: Keisha Smith DO      Chief complaint:   Chief Complaint   Patient presents with    Atrial fibrillation, chronic       Problem List:  Permanent atrial fibrillation  Diagnosed 2018 after TIA.   Cardiolite stress test 5/10/18: no ischemia, EF 63%. OSH.  Echo 5/3/18: EF 60%, mild LVH, LA mildly dilated. No sig valvular disease. Dilated IVC. OSH.   CHADSVASC = 4, on Xarelto.   Hypertension  Hyperlipidemia  CVA  Carotid artery duplex 2018: No sig stenosis bilaterally.   MR Brain 2018: two foci of acute focal cortical infarcts on the right side  Ulcerative colitis  Diagnosed around .   Followed by Dr. José Miguel Corbin   Former tobacco use  Quit 2020.    No Known Allergies    Current Medications:    Current Outpatient Medications:     ascorbic acid (VITAMIN C) 1000 MG tablet, Take 1 tablet by mouth Daily., Disp: , Rfl:     Coenzyme Q10 (CO Q 10 PO), Take 300 mg by mouth Daily., Disp: , Rfl:     fluticasone (FLONASE) 50 MCG/ACT nasal spray, 1 spray into the nostril(s) as directed by provider As Needed., Disp: , Rfl:     folic acid (FOLVITE) 1 MG tablet, Take 1 tablet by mouth Daily., Disp: , Rfl:     Glucosamine-Chondroit-Vit C-Mn (GLUCOSAMINE CHONDR 1500 COMPLX PO), Take  by mouth., Disp: , Rfl:     glucosamine-chondroitin 500-400 MG per tablet, Take 1 tablet by mouth 2 (Two) Times a Day., Disp: , Rfl:     hydrocortisone 2.5 % cream, Apply 1 application  topically to the appropriate area as directed As Needed., Disp: , Rfl:     ipratropium (ATROVENT) 0.06 % nasal spray, 1 spray into the nostril(s) as directed by provider As Needed., Disp: , Rfl:     levocetirizine (XYZAL) 5 MG tablet, Take 1 tablet by mouth., Disp: , Rfl:     Magnesium 70 MG capsule, Take 70 mg by mouth Daily., Disp: , Rfl:     mercaptopurine (PURINETHOL) 50 MG  chemo tablet, Take 1 tablet by mouth Daily., Disp: , Rfl:     Polysacch Fe Cmp-Fe Heme Poly (Feosol Bifera) 28 MG tablet, Take 1 tablet by mouth Daily. (Patient taking differently: Take 2 tablets by mouth Daily.), Disp: 30 tablet, Rfl: 0    pyridoxine (VITAMIN B-6) 50 MG tablet, Take 1 tablet by mouth Daily., Disp: , Rfl:     rivaroxaban (XARELTO) 20 MG tablet, Take 1 tablet by mouth Daily., Disp: , Rfl:     rosuvastatin (CRESTOR) 10 MG tablet, Take 1 tablet by mouth Every Night., Disp: , Rfl:     thiamine (VITAMIN B-1) 100 MG tablet  tablet, Take 0.5 tablets by mouth Daily., Disp: , Rfl:     vedolizumab (Entyvio) 300 MG injection, Infuse 5 mL into a venous catheter 1 (One) Time. Infusion every 8 weeks. Last dosage 12/30/2022., Disp: , Rfl:     vitamin B-12 (CYANOCOBALAMIN) 100 MCG tablet, Take 5 tablets by mouth Daily., Disp: , Rfl:     cetirizine (zyrTEC) 10 MG tablet, Take 1 tablet by mouth Daily. (Patient not taking: Reported on 1/24/2024), Disp: , Rfl:     metFORMIN (GLUCOPHAGE) 1000 MG tablet, Take 1,000 mg by mouth Daily With Breakfast. (Patient not taking: Reported on 1/24/2024), Disp: , Rfl:     HPI    Oskar Jimenes is a 68 y.o. male who presents today for a follow up of atrial fibrillation and cardiac risk factors. Since last visit, patient has been doing well overall from a cardiovascular standpoint. He stays busy and active by walking 2 miles 3 times weekly but notes after a trip to [place] with his wife he contracted COVID-19 and has not been as active since. Patient plans to resume physical activity once his symptoms resolve. He denies chest pain, shortness of breath, orthopnea, palpitations, edema, dizziness, and syncope.       The following portions of the patient's history were reviewed and updated as appropriate: allergies, current medications and problem list.    Pertinent positives as listed in the HPI.  All other systems reviewed are negative.         Vitals:    01/24/24 1259   BP:  "136/78   BP Location: Left arm   Patient Position: Sitting   Cuff Size: Adult   Pulse: 63   SpO2: 98%   Weight: 104 kg (229 lb 6.4 oz)   Height: 180.3 cm (71\")       Physical Exam:  General: Alert and oriented.  Neck: Jugular venous pressure is within normal limits. Carotids have normal upstrokes without bruits.   Cardiovascular: Heart has a nondisplaced focal PMI. Irregularly irregular rate and rhythm. No murmur, gallop or rub.  Lungs: Clear, no rales or wheezes. Equal expansion is noted.   Extremities: Show no edema.  Skin: Warm and dry.  Neurologic: Nonfocal.     Diagnostic Data (reviewed with patient):  Lab date: 11/14/2023  FLP: , TG 69, HDL 52, LDL 83  CMP: Glu 102, BUN 16, Creat 0.87, eGFR 93, Na 135, K 4.6, Cl 99, CO2 25, Ca 9.2, Alk Phos 62, AST 32, ALT 35    Advance Care Planning   ACP discussion was declined by the patient. Patient does not have an advance directive, declines further assistance.           ECG 12 Lead    Date/Time: 1/24/2024 1:26 PM  Performed by: Caitlyn Cardenas MD    Authorized by: Caitlyn Cardenas MD  Comparison: compared with previous ECG from 1/12/2023  Similar to previous ECG  Rhythm: atrial fibrillation  BPM: 63    Clinical impression: abnormal EKG            Assessment:    ICD-10-CM ICD-9-CM   1. Atrial fibrillation, chronic  I48.20 427.31   2. Essential hypertension  I10 401.9   3. Mixed hyperlipidemia  E78.2 272.2         Plan:  Continue on Xarelto 20 mg daily for stroke prophylaxis.   Continue on rosuvastatin 10 mg daily for hyperlipidemia.   Continue all other current medications.  F/up in 12 months, sooner if needed.      Scribed for Caitlyn Cardenas MD by Ulices Lakhani. 1/24/2024 13:15 EST    I Caitlyn Cardenas MD personally performed the services described in this documentation as scribed by the above individual in my presence, and it is both accurate and complete.    Caitlyn Cardenas MD, FACC              "

## 2024-07-06 PROBLEM — R53.83 OTHER FATIGUE: Status: ACTIVE | Noted: 2024-07-06

## 2024-07-06 PROBLEM — Z79.899 HIGH RISK MEDICATION USE: Status: ACTIVE | Noted: 2024-07-06

## 2024-07-06 PROBLEM — G89.29 CHRONIC PAIN OF MULTIPLE JOINTS: Status: ACTIVE | Noted: 2024-07-06

## 2024-07-06 PROBLEM — M25.50 CHRONIC PAIN OF MULTIPLE JOINTS: Status: ACTIVE | Noted: 2024-07-06

## 2024-07-06 PROBLEM — R20.0 NUMBNESS AND TINGLING: Chronic | Status: ACTIVE | Noted: 2024-07-06

## 2024-07-06 PROBLEM — R20.2 NUMBNESS AND TINGLING: Chronic | Status: ACTIVE | Noted: 2024-07-06

## 2024-07-09 ENCOUNTER — OFFICE VISIT (OUTPATIENT)
Age: 69
End: 2024-07-09
Payer: MEDICARE

## 2024-07-09 ENCOUNTER — LAB (OUTPATIENT)
Facility: HOSPITAL | Age: 69
End: 2024-07-09
Payer: MEDICARE

## 2024-07-09 VITALS
HEART RATE: 57 BPM | DIASTOLIC BLOOD PRESSURE: 80 MMHG | HEIGHT: 71 IN | TEMPERATURE: 97.7 F | SYSTOLIC BLOOD PRESSURE: 150 MMHG | WEIGHT: 231 LBS | BODY MASS INDEX: 32.34 KG/M2

## 2024-07-09 DIAGNOSIS — G47.30 SLEEP APNEA, UNSPECIFIED TYPE: ICD-10-CM

## 2024-07-09 DIAGNOSIS — M25.50 CHRONIC PAIN OF MULTIPLE JOINTS: ICD-10-CM

## 2024-07-09 DIAGNOSIS — R20.2 NUMBNESS AND TINGLING: Chronic | ICD-10-CM

## 2024-07-09 DIAGNOSIS — R53.83 OTHER FATIGUE: ICD-10-CM

## 2024-07-09 DIAGNOSIS — G89.29 CHRONIC PAIN OF MULTIPLE JOINTS: ICD-10-CM

## 2024-07-09 DIAGNOSIS — R20.0 NUMBNESS AND TINGLING: Chronic | ICD-10-CM

## 2024-07-09 DIAGNOSIS — K51.811 OTHER ULCERATIVE COLITIS WITH RECTAL BLEEDING: Primary | ICD-10-CM

## 2024-07-09 DIAGNOSIS — K51.811 OTHER ULCERATIVE COLITIS WITH RECTAL BLEEDING: ICD-10-CM

## 2024-07-09 DIAGNOSIS — Z79.899 HIGH RISK MEDICATION USE: ICD-10-CM

## 2024-07-09 PROCEDURE — 3077F SYST BP >= 140 MM HG: CPT | Performed by: NURSE PRACTITIONER

## 2024-07-09 PROCEDURE — 1160F RVW MEDS BY RX/DR IN RCRD: CPT | Performed by: NURSE PRACTITIONER

## 2024-07-09 PROCEDURE — 85652 RBC SED RATE AUTOMATED: CPT

## 2024-07-09 PROCEDURE — 36415 COLL VENOUS BLD VENIPUNCTURE: CPT

## 2024-07-09 PROCEDURE — 85027 COMPLETE CBC AUTOMATED: CPT

## 2024-07-09 PROCEDURE — 99214 OFFICE O/P EST MOD 30 MIN: CPT | Performed by: NURSE PRACTITIONER

## 2024-07-09 PROCEDURE — G2211 COMPLEX E/M VISIT ADD ON: HCPCS | Performed by: NURSE PRACTITIONER

## 2024-07-09 PROCEDURE — 1159F MED LIST DOCD IN RCRD: CPT | Performed by: NURSE PRACTITIONER

## 2024-07-09 PROCEDURE — 85007 BL SMEAR W/DIFF WBC COUNT: CPT

## 2024-07-09 PROCEDURE — 86140 C-REACTIVE PROTEIN: CPT

## 2024-07-09 PROCEDURE — 3079F DIAST BP 80-89 MM HG: CPT | Performed by: NURSE PRACTITIONER

## 2024-07-09 PROCEDURE — 80053 COMPREHEN METABOLIC PANEL: CPT

## 2024-07-09 RX ORDER — LISINOPRIL 10 MG/1
10 TABLET ORAL DAILY
COMMUNITY
Start: 2024-06-17

## 2024-07-09 RX ORDER — HYDROCHLOROTHIAZIDE 12.5 MG/1
12.5 CAPSULE, GELATIN COATED ORAL EVERY MORNING
COMMUNITY
Start: 2024-06-19

## 2024-07-09 RX ORDER — EPINEPHRINE 0.3 MG/.3ML
INJECTION SUBCUTANEOUS
COMMUNITY
Start: 2024-05-07

## 2024-07-09 NOTE — ASSESSMENT & PLAN NOTE
- Patient has peripheral neuropathy in hands and feet.     - He does have pain with waking at times.

## 2024-07-09 NOTE — ASSESSMENT & PLAN NOTE
- Currently not treated which is concerning and could cause worsening joint pains    - He used CPAP 6-7 months.    - Recommend he try again.

## 2024-07-09 NOTE — ASSESSMENT & PLAN NOTE
- Patient probably has a combination of inflammatory arthritis, related to his ulcerative colitis, as well as secondary osteoarthritis.     - Continue with Tylenol for pain  - See Ulcerative colitis as above

## 2024-07-09 NOTE — ASSESSMENT & PLAN NOTE
- Patient was diagnosed in 2005, previously in remission after his initial episode with Asacol and 6MP. After he quit smoking in 2020, he had a flare and has not been able to get this under control since. GI is Dr. Corbin.  - I do think his joint pain is related to his UC. He is still not controlled on current regimen   - Current Rx:  Entyvio once a month and 6MP 50mg daily.    - Previous Rx: Asacol and Humira which were ineffective.     - continue taking Entyvio once a month and 6MP 50mg daily.  He does feel Entyvio is helpful.  - He has decided not to add or change medications at this time.  - He does not want to add medications at this time.  - Could consider tramadol  PRN prednisone tapers.  - Request labs from PCP and Dr. Corbin

## 2024-07-09 NOTE — PROGRESS NOTES
Office Visit       Date: 07/09/2024   Patient Name: Oskar Jimenes  MRN: [id number]  YOB: 1955    Referring Physician: Referring, Self     Chief Complaint:   Chief Complaint   Patient presents with    Joint Pain       History of Present Illness: Oskar Jimenes is a 69 y.o. male who is here today for follow-up of joint pain.  He has a diagnosis of ulcerative colitis as well.  At his original visit Dr. Jeanine Mcenal recommended changing Entyvio to something that covers his joint pain or adding subcu methotrexate or even Plaquenil to the Entyvio.  The patient declined at that time.  His labs through [facility] did show a mildly positive rheumatoid factor with a negative CCP antibody, mildly positive MALENA 1: 180, normal CRP and sed rate.    Today he rates his pain 4 out of 10 and has 30 minutes of morning stiffness.  He reports feeling the same.  He has been started on lisinopril.      Subjective   Review of Systems:  Review of Systems   HENT:  Positive for rhinorrhea.    Cardiovascular:  Positive for leg swelling.        Claudication   Musculoskeletal:  Positive for back pain and myalgias.   Neurological:  Positive for numbness.        Tingling of feet   All other systems reviewed and are negative.       Past Medical History:   Past Medical History:   Diagnosis Date    Anemia     Arrhythmia     Atrial fibrillation 2017    Broken nose     toes and coccyx    Clostridium difficile colitis 10/2021    Clotting disorder 2017    Started after taking Xarelto    Colitis     Deep vein thrombosis 10/01/2021    Localized in left arm--now clear.    Hypertension 2010    Mixed hyperlipidemia     Sleep apnea 2021    Stroke 2017       Past Surgical History:   Past Surgical History:   Procedure Laterality Date    APPENDECTOMY      ORIF ZYGOMATIC FRACTURE      TONSILLECTOMY         Family History:   Family History   Problem Relation Age of Onset    Heart failure Mother     Diabetes  Mother     Heart disease Father     Heart failure Father     Kidney failure Father     Hypertension Father     Kidney disease Father     Diabetes Brother     Scleroderma Son        Social History:   Social History     Socioeconomic History    Marital status:    Tobacco Use    Smoking status: Former     Current packs/day: 0.00     Average packs/day: 0.5 packs/day for 42.6 years (21.3 ttl pk-yrs)     Types: Cigarettes     Start date: 10/1/1977     Quit date: 2020     Years since quittin.2    Smokeless tobacco: Never   Vaping Use    Vaping status: Never Used   Substance and Sexual Activity    Alcohol use: Yes     Alcohol/week: 2.0 - 3.0 standard drinks of alcohol     Types: 2 - 3 Cans of beer per week     Comment: consumed rarely    Drug use: Never    Sexual activity: Not Currently     Partners: Female       Medications:   Current Outpatient Medications:     ascorbic acid (VITAMIN C) 1000 MG tablet, Take 1 tablet by mouth Daily., Disp: , Rfl:     cetirizine (zyrTEC) 10 MG tablet, Take 1 tablet by mouth Daily., Disp: , Rfl:     Coenzyme Q10 (CO Q 10 PO), Take 300 mg by mouth Daily., Disp: , Rfl:     EPINEPHrine (EPIPEN) 0.3 MG/0.3ML solution auto-injector injection, , Disp: , Rfl:     fluticasone (FLONASE) 50 MCG/ACT nasal spray, 1 spray into the nostril(s) as directed by provider As Needed., Disp: , Rfl:     folic acid (FOLVITE) 1 MG tablet, Take 1 tablet by mouth Daily., Disp: , Rfl:     Glucosamine-Chondroit-Vit C-Mn (GLUCOSAMINE CHONDR 1500 COMPLX PO), Take  by mouth., Disp: , Rfl:     glucosamine-chondroitin 500-400 MG per tablet, Take 1 tablet by mouth 2 (Two) Times a Day., Disp: , Rfl:     hydroCHLOROthiazide (MICROZIDE) 12.5 MG capsule, Take 1 capsule by mouth Every Morning., Disp: , Rfl:     hydrocortisone 2.5 % cream, Apply 1 Application topically to the appropriate area as directed As Needed., Disp: , Rfl:     ipratropium (ATROVENT) 0.06 % nasal spray, 1 spray into the nostril(s) as directed  "by provider As Needed., Disp: , Rfl:     levocetirizine (XYZAL) 5 MG tablet, Take 1 tablet by mouth., Disp: , Rfl:     lisinopril (PRINIVIL,ZESTRIL) 10 MG tablet, Take 1 tablet by mouth Daily., Disp: , Rfl:     Magnesium 70 MG capsule, Take 70 mg by mouth Daily., Disp: , Rfl:     magnesium oxide (MAG-OX) 400 MG tablet, Take 1 tablet by mouth Daily., Disp: , Rfl:     mercaptopurine (PURINETHOL) 50 MG chemo tablet, Take 1 tablet by mouth Daily., Disp: , Rfl:     metFORMIN (GLUCOPHAGE) 1000 MG tablet, Take 1 tablet by mouth Daily With Breakfast., Disp: , Rfl:     Polysacch Fe Cmp-Fe Heme Poly (Feosol Bifera) 28 MG tablet, Take 1 tablet by mouth Daily. (Patient taking differently: Take 2 tablets by mouth Daily.), Disp: 30 tablet, Rfl: 0    pyridoxine (VITAMIN B-6) 50 MG tablet, Take 1 tablet by mouth Daily., Disp: , Rfl:     rivaroxaban (XARELTO) 20 MG tablet, Take 1 tablet by mouth Daily., Disp: , Rfl:     rosuvastatin (CRESTOR) 10 MG tablet, Take 1 tablet by mouth Every Night., Disp: , Rfl:     thiamine (VITAMIN B-1) 100 MG tablet  tablet, Take 0.5 tablets by mouth Daily., Disp: , Rfl:     vedolizumab (Entyvio) 300 MG injection, Infuse 5 mL into a venous catheter 1 (One) Time. Infusion every 8 weeks. Last dosage 12/30/2022., Disp: , Rfl:     Allergies: No Known Allergies    I have reviewed and updated the patient's chief complaint, history of present illness, review of systems, past medical history, surgical history, family history, social history, medications and allergy list as appropriate.     Objective    Vital Signs:   Vitals:    07/09/24 1133   BP: 150/80   BP Location: Left arm   Patient Position: Sitting   Cuff Size: Adult   Pulse: 57   Temp: 97.7 °F (36.5 °C)   Weight: 105 kg (231 lb)   Height: 180.3 cm (70.98\")   PainSc:   4   PainLoc: Generalized  Comment: joints     Body mass index is 32.23 kg/m².   BMI is >= 30 and <35. (Class 1 Obesity). The following options were offered after discussion;: defer to " PCP       Physical Exam:  Physical Exam  Vitals reviewed.   Constitutional:       Appearance: Normal appearance.   HENT:      Head: Normocephalic and atraumatic.      Mouth/Throat:      Mouth: Mucous membranes are moist.   Eyes:      Conjunctiva/sclera: Conjunctivae normal.   Cardiovascular:      Rate and Rhythm: Normal rate and regular rhythm.      Pulses: Normal pulses.      Heart sounds: Normal heart sounds.   Pulmonary:      Effort: Pulmonary effort is normal.      Breath sounds: Normal breath sounds.   Musculoskeletal:         General: Normal range of motion.      Cervical back: Normal range of motion and neck supple.      Comments: No synovitis.   Skin:     General: Skin is warm and dry.      Comments: Venous stasis changes bilateral lower legs.   Neurological:      General: No focal deficit present.      Mental Status: He is alert and oriented to person, place, and time. Mental status is at baseline.   Psychiatric:         Mood and Affect: Mood normal.         Behavior: Behavior normal.         Thought Content: Thought content normal.         Judgment: Judgment normal.          Results Review:   Imaging Results (Last 24 Hours)       ** No results found for the last 24 hours. **            Procedures    Assessment / Plan    Assessment/Plan:   Diagnoses and all orders for this visit:    1. Other ulcerative colitis with rectal bleeding (Primary)  Assessment & Plan:  - Patient was diagnosed in 2005, previously in remission after his initial episode with Asacol and 6MP. After he quit smoking in 2020, he had a flare and has not been able to get this under control since. GI is Dr. Corbin.  - I do think his joint pain is related to his UC. He is still not controlled on current regimen   - Current Rx:  Entyvio once a month and 6MP 50mg daily.    - Previous Rx: Asacol and Humira which were ineffective.     - continue taking Entyvio once a month and 6MP 50mg daily.  He does feel Entyvio is helpful.  - He has decided not  to add or change medications at this time.  - He does not want to add medications at this time.  - Could consider tramadol  PRN prednisone tapers.  - Request labs from PCP and Dr. Corbin    Orders:  -     CBC With Manual Differential; Future  -     Comprehensive Metabolic Panel; Future  -     C-reactive Protein; Future  -     Sedimentation Rate; Future    2. Chronic pain of multiple joints  Assessment & Plan:  - Patient probably has a combination of inflammatory arthritis, related to his ulcerative colitis, as well as secondary osteoarthritis.     - Continue with Tylenol for pain  - See Ulcerative colitis as above    Orders:  -     CBC With Manual Differential; Future  -     Comprehensive Metabolic Panel; Future  -     C-reactive Protein; Future  -     Sedimentation Rate; Future    3. Other fatigue  Assessment & Plan:  Improved      4. Sleep apnea, unspecified type  Assessment & Plan:  - Currently not treated which is concerning and could cause worsening joint pains    - He used CPAP 6-7 months.    - Recommend he try again.      5. High risk medication use  Assessment & Plan:  He has declined adding another medication at this time      6. Numbness and tingling  Assessment & Plan:  - Patient has peripheral neuropathy in hands and feet.     - He does have pain with waking at times.          Follow Up:   Return in about 6 months (around 1/9/2025) for Dr. Little.        ELADIA Gomes   [facility]

## 2024-07-10 ENCOUNTER — SPECIALTY PHARMACY (OUTPATIENT)
Age: 69
End: 2024-07-10
Payer: MEDICARE

## 2024-07-10 LAB
ALBUMIN SERPL-MCNC: 4.1 G/DL (ref 3.5–5.2)
ALBUMIN/GLOB SERPL: 1.4 G/DL
ALP SERPL-CCNC: 55 U/L (ref 39–117)
ALT SERPL W P-5'-P-CCNC: 22 U/L (ref 1–41)
ANION GAP SERPL CALCULATED.3IONS-SCNC: 12.9 MMOL/L (ref 5–15)
AST SERPL-CCNC: 23 U/L (ref 1–40)
BASOPHILS # BLD MANUAL: 0 10*3/MM3 (ref 0–0.2)
BASOPHILS NFR BLD MANUAL: 0 % (ref 0–1.5)
BILIRUB SERPL-MCNC: 0.5 MG/DL (ref 0–1.2)
BUN SERPL-MCNC: 9 MG/DL (ref 8–23)
BUN/CREAT SERPL: 10.3 (ref 7–25)
CALCIUM SPEC-SCNC: 8.7 MG/DL (ref 8.6–10.5)
CHLORIDE SERPL-SCNC: 99 MMOL/L (ref 98–107)
CO2 SERPL-SCNC: 25.1 MMOL/L (ref 22–29)
CREAT SERPL-MCNC: 0.87 MG/DL (ref 0.76–1.27)
CRP SERPL-MCNC: 0.43 MG/DL (ref 0–0.5)
DEPRECATED RDW RBC AUTO: 42.2 FL (ref 37–54)
EGFRCR SERPLBLD CKD-EPI 2021: 92.8 ML/MIN/1.73
EOSINOPHIL # BLD MANUAL: 0.24 10*3/MM3 (ref 0–0.4)
EOSINOPHIL NFR BLD MANUAL: 3.1 % (ref 0.3–6.2)
ERYTHROCYTE [DISTWIDTH] IN BLOOD BY AUTOMATED COUNT: 12.1 % (ref 12.3–15.4)
ERYTHROCYTE [SEDIMENTATION RATE] IN BLOOD: 20 MM/HR (ref 0–20)
GLOBULIN UR ELPH-MCNC: 3 GM/DL
GLUCOSE SERPL-MCNC: 115 MG/DL (ref 65–99)
HCT VFR BLD AUTO: 42.7 % (ref 37.5–51)
HGB BLD-MCNC: 14.6 G/DL (ref 13–17.7)
LYMPHOCYTES # BLD MANUAL: 1.85 10*3/MM3 (ref 0.7–3.1)
LYMPHOCYTES NFR BLD MANUAL: 8.2 % (ref 5–12)
MCH RBC QN AUTO: 32.9 PG (ref 26.6–33)
MCHC RBC AUTO-ENTMCNC: 34.2 G/DL (ref 31.5–35.7)
MCV RBC AUTO: 96.2 FL (ref 79–97)
MONOCYTES # BLD: 0.64 10*3/MM3 (ref 0.1–0.9)
NEUTROPHILS # BLD AUTO: 5.06 10*3/MM3 (ref 1.7–7)
NEUTROPHILS NFR BLD MANUAL: 64.9 % (ref 42.7–76)
PLAT MORPH BLD: NORMAL
PLATELET # BLD AUTO: 255 10*3/MM3 (ref 140–450)
PMV BLD AUTO: 9.6 FL (ref 6–12)
POTASSIUM SERPL-SCNC: 4.4 MMOL/L (ref 3.5–5.2)
PROT SERPL-MCNC: 7.1 G/DL (ref 6–8.5)
RBC # BLD AUTO: 4.44 10*6/MM3 (ref 4.14–5.8)
RBC MORPH BLD: NORMAL
SODIUM SERPL-SCNC: 137 MMOL/L (ref 136–145)
VARIANT LYMPHS NFR BLD MANUAL: 2.1 % (ref 0–5)
VARIANT LYMPHS NFR BLD MANUAL: 21.6 % (ref 19.6–45.3)
WBC MORPH BLD: NORMAL
WBC NRBC COR # BLD AUTO: 7.79 10*3/MM3 (ref 3.4–10.8)

## 2025-01-09 ENCOUNTER — HOSPITAL ENCOUNTER (EMERGENCY)
Facility: HOSPITAL | Age: 70
Discharge: HOME | End: 2025-01-09
Attending: EMERGENCY MEDICINE
Payer: MEDICARE

## 2025-01-09 VITALS
TEMPERATURE: 97.8 F | WEIGHT: 243 LBS | RESPIRATION RATE: 20 BRPM | HEIGHT: 71 IN | HEART RATE: 42 BPM | SYSTOLIC BLOOD PRESSURE: 135 MMHG | DIASTOLIC BLOOD PRESSURE: 70 MMHG | OXYGEN SATURATION: 98 % | BODY MASS INDEX: 34.02 KG/M2

## 2025-01-09 DIAGNOSIS — S61.219A FINGER LACERATION, INITIAL ENCOUNTER: Primary | ICD-10-CM

## 2025-01-09 PROCEDURE — 90715 TDAP VACCINE 7 YRS/> IM: CPT | Performed by: PHYSICIAN ASSISTANT

## 2025-01-09 PROCEDURE — 25010000002 LIDOCAINE PF 1% 1 % SOLUTION: Performed by: PHYSICIAN ASSISTANT

## 2025-01-09 PROCEDURE — 90471 IMMUNIZATION ADMIN: CPT

## 2025-01-09 PROCEDURE — 25010000002 TETANUS-DIPHTH-ACELL PERTUSSIS 5-2.5-18.5 LF-MCG/0.5 SUSPENSION PREFILLED SYRINGE: Performed by: PHYSICIAN ASSISTANT

## 2025-01-09 PROCEDURE — 99282 EMERGENCY DEPT VISIT SF MDM: CPT

## 2025-01-09 RX ORDER — LIDOCAINE HYDROCHLORIDE 10 MG/ML
10 INJECTION, SOLUTION EPIDURAL; INFILTRATION; INTRACAUDAL; PERINEURAL ONCE
Status: COMPLETED | OUTPATIENT
Start: 2025-01-09 | End: 2025-01-09

## 2025-01-09 RX ADMIN — LIDOCAINE HYDROCHLORIDE 10 ML: 10 INJECTION, SOLUTION EPIDURAL; INFILTRATION; INTRACAUDAL; PERINEURAL at 17:10

## 2025-01-09 RX ADMIN — TETANUS TOXOID, REDUCED DIPHTHERIA TOXOID AND ACELLULAR PERTUSSIS VACCINE, ADSORBED 0.5 ML: 5; 2.5; 8; 8; 2.5 SUSPENSION INTRAMUSCULAR at 17:09

## 2025-01-09 NOTE — FSED PROVIDER NOTE
Subjective   History of Present Illness  Patient presents to ED with complaint of finger laceration.  He states he was doing dishes just prior to arrival when he sliced his finger on a knife in the sink.  He is unsure of date of last tetanus immunization.  Patient does take Xarelto and had a hard time controlling the bleeding at home.  He denies shortness of breath, chest pain, severe pain at site, range of motion problems, or purulent drainage        Review of Systems   Skin:  Positive for wound.   All other systems reviewed and are negative.      Past Medical History:   Diagnosis Date    Anemia     Arrhythmia     Atrial fibrillation 2017    Broken nose     toes and coccyx    Clostridium difficile colitis 10/2021    Clotting disorder 2017    Started after taking Xarelto    Colitis     Deep vein thrombosis 10/01/2021    Localized in left arm--now clear.    Hypertension     Mixed hyperlipidemia     Sleep apnea     Stroke        No Known Allergies    Past Surgical History:   Procedure Laterality Date    APPENDECTOMY      ORIF ZYGOMATIC FRACTURE      TONSILLECTOMY         Family History   Problem Relation Age of Onset    Heart failure Mother     Diabetes Mother     Heart disease Father     Heart failure Father     Kidney failure Father     Hypertension Father     Kidney disease Father     Diabetes Brother     Scleroderma Son        Social History     Socioeconomic History    Marital status:    Tobacco Use    Smoking status: Former     Current packs/day: 0.00     Average packs/day: 0.5 packs/day for 42.6 years (21.3 ttl pk-yrs)     Types: Cigarettes     Start date: 10/1/1977     Quit date: 2020     Years since quittin.7    Smokeless tobacco: Never   Vaping Use    Vaping status: Never Used   Substance and Sexual Activity    Alcohol use: Yes     Alcohol/week: 2.0 - 3.0 standard drinks of alcohol     Types: 2 - 3 Cans of beer per week     Comment: consumed rarely    Drug use: Never    Sexual  activity: Not Currently     Partners: Female           Objective   Physical Exam  Vitals and nursing note reviewed.   Constitutional:       Appearance: Normal appearance. He is normal weight.   HENT:      Head: Normocephalic and atraumatic.      Right Ear: Tympanic membrane, ear canal and external ear normal.      Left Ear: Tympanic membrane, ear canal and external ear normal.      Nose: Nose normal.      Mouth/Throat:      Mouth: Mucous membranes are moist.      Pharynx: Oropharynx is clear.   Eyes:      Extraocular Movements: Extraocular movements intact.      Conjunctiva/sclera: Conjunctivae normal.      Pupils: Pupils are equal, round, and reactive to light.   Cardiovascular:      Rate and Rhythm: Normal rate and regular rhythm.      Pulses: Normal pulses.      Heart sounds: Normal heart sounds.   Pulmonary:      Effort: Pulmonary effort is normal.      Breath sounds: Normal breath sounds.   Abdominal:      General: Abdomen is flat. Bowel sounds are normal.      Palpations: Abdomen is soft.   Musculoskeletal:         General: Normal range of motion.      Cervical back: Normal range of motion and neck supple.   Skin:     General: Skin is warm and dry.      Capillary Refill: Capillary refill takes less than 2 seconds.      Findings: Laceration present.          Neurological:      General: No focal deficit present.      Mental Status: He is alert and oriented to person, place, and time. Mental status is at baseline.   Psychiatric:         Mood and Affect: Mood normal.         Behavior: Behavior normal.         Thought Content: Thought content normal.         Judgment: Judgment normal.         Laceration Repair    Date/Time: 1/9/2025 6:21 PM    Performed by: Milena Orona PA  Authorized by: Poncho Kinsey MD    Consent:     Consent obtained:  Verbal    Consent given by:  Patient    Risks discussed:  Infection and pain  Laceration details:     Location:  Finger    Finger location:  R index finger     Length (cm):  4  Pre-procedure details:     Preparation:  Patient was prepped and draped in usual sterile fashion  Exploration:     Hemostasis achieved with:  Direct pressure    Contaminated: no    Treatment:     Area cleansed with:  Chlorhexidine    Amount of cleaning:  Extensive    Irrigation solution:  Sterile saline  Skin repair:     Repair method:  Sutures    Suture size:  4-0    Suture material:  Prolene    Suture technique:  Simple interrupted    Number of sutures:  5  Approximation:     Approximation:  Close  Post-procedure details:     Dressing:  Sterile dressing    Procedure completion:  Tolerated well, no immediate complications             ED Course                                           Medical Decision Making  Hemostasis achieved with sutures.  Patient tolerated procedure well.  Tetanus immunization administered during visit today.  Patient given wound care instructions and instructed to see PCP for suture removal in 10 days.    Problems Addressed:  Finger laceration, initial encounter: complicated acute illness or injury    Risk  Prescription drug management.        Final diagnoses:   Finger laceration, initial encounter       ED Disposition  ED Disposition       ED Disposition   Discharge    Condition   Stable    Comment   --               Keisha Smith, DO  [address] [facility] [id number] [phone]    In 10 days  For suture removal         Medication List        Changed      Feosol Bifera 28 MG tablet  Take 1 tablet by mouth Daily.  What changed: how much to take

## 2025-01-20 NOTE — PROGRESS NOTES
[facility] Cardiology    Patient ID: Oskar Jimenes is a 69 y.o. male.  : 1955   Contact: [phone]    Encounter date: 2025    PCP: Keisha Smith DO      Chief complaint:   Chief Complaint   Patient presents with    Atrial fibrillation, chronic     1 yr       Problem List:  Permanent atrial fibrillation  Diagnosed 2018 after TIA.   Cardiolite stress test 5/10/18: no ischemia, EF 63%. OSH.  Echo 5/3/18: EF 60%, mild LVH, LA mildly dilated. No sig valvular disease. Dilated IVC. OSH.   CHADSVASC = 4, on Xarelto.   Hypertension  Hyperlipidemia  CVA  Carotid artery duplex 2018: No sig stenosis bilaterally.   MR Brain 2018: two foci of acute focal cortical infarcts on the right side  Ulcerative colitis  Diagnosed around .   Followed by Dr. José Miguel Corbin   Former tobacco use  Quit 2020.    No Known Allergies    Current Medications:    Current Outpatient Medications:     ascorbic acid (VITAMIN C) 1000 MG tablet, Take 1 tablet by mouth Daily., Disp: , Rfl:     cetirizine (zyrTEC) 10 MG tablet, Take 1 tablet by mouth Daily., Disp: , Rfl:     Coenzyme Q10 (CO Q 10 PO), Take 300 mg by mouth Daily., Disp: , Rfl:     EPINEPHrine (EPIPEN) 0.3 MG/0.3ML solution auto-injector injection, , Disp: , Rfl:     fluticasone (FLONASE) 50 MCG/ACT nasal spray, Administer 1 spray into the nostril(s) as directed by provider As Needed., Disp: , Rfl:     folic acid (FOLVITE) 1 MG tablet, Take 1 tablet by mouth Daily., Disp: , Rfl:     Glucosamine-Chondroit-Vit C-Mn (GLUCOSAMINE CHONDR 1500 COMPLX PO), Take  by mouth., Disp: , Rfl:     glucosamine-chondroitin 500-400 MG per tablet, Take 1 tablet by mouth 2 (Two) Times a Day., Disp: , Rfl:     hydroCHLOROthiazide (MICROZIDE) 12.5 MG capsule, Take 1 capsule by mouth Every Morning., Disp: , Rfl:     hydrocortisone 2.5 % cream, Apply 1 Application topically to the appropriate area as directed As Needed., Disp: , Rfl:     ipratropium  (ATROVENT) 0.06 % nasal spray, Administer 1 spray into the nostril(s) as directed by provider As Needed., Disp: , Rfl:     levocetirizine (XYZAL) 5 MG tablet, Take 1 tablet by mouth., Disp: , Rfl:     lisinopril (PRINIVIL,ZESTRIL) 10 MG tablet, Take 1 tablet by mouth Daily., Disp: , Rfl:     Magnesium 70 MG capsule, Take 70 mg by mouth Daily., Disp: , Rfl:     magnesium oxide (MAG-OX) 400 MG tablet, Take 1 tablet by mouth Daily., Disp: , Rfl:     mercaptopurine (PURINETHOL) 50 MG chemo tablet, Take 1 tablet by mouth Daily., Disp: , Rfl:     metFORMIN (GLUCOPHAGE) 1000 MG tablet, Take 1 tablet by mouth Daily With Breakfast., Disp: , Rfl:     Polysacch Fe Cmp-Fe Heme Poly (Feosol Bifera) 28 MG tablet, Take 1 tablet by mouth Daily. (Patient taking differently: Take 2 tablets by mouth Daily.), Disp: 30 tablet, Rfl: 0    pyridoxine (VITAMIN B-6) 50 MG tablet, Take 1 tablet by mouth Daily., Disp: , Rfl:     rivaroxaban (XARELTO) 20 MG tablet, Take 1 tablet by mouth Daily., Disp: , Rfl:     rosuvastatin (CRESTOR) 10 MG tablet, Take 1 tablet by mouth Every Night., Disp: , Rfl:     thiamine (VITAMIN B-1) 100 MG tablet  tablet, Take 0.5 tablets by mouth Daily., Disp: , Rfl:     vedolizumab (Entyvio) 300 MG injection, Infuse 5 mL into a venous catheter 1 (One) Time. Infusion every 8 weeks. Last dosage 12/30/2022., Disp: , Rfl:     HPI    Oskar Jimenes is a 69 y.o. male who presents today for a follow up of atrial fibrillation and cardiac risk factors. Since last visit, Amadeo has done well.  He cannot tell that he is in A-fib.  He does not have any problems with excessive heart rates.  He has not exercised for almost a year but is going to get back into it as he knows he needs to.  He denies chest pain and dyspnea as well as lower extremity edema.      The following portions of the patient's history were reviewed and updated as appropriate: allergies, current medications and problem list.    Pertinent positives as  "listed in the HPI.  All other systems reviewed are negative.         Vitals:    01/29/25 1149   BP: 120/66   BP Location: Left arm   Patient Position: Sitting   Cuff Size: Adult   Pulse: 61   SpO2: 98%   Weight: 102 kg (225 lb 9.6 oz)   Height: 180.3 cm (71\")       Physical Exam:  General: Alert and oriented.  Neck: Jugular venous pressure is within normal limits. Carotids have normal upstrokes without bruits.   Cardiovascular: Heart has a nondisplaced focal PMI. Irregular rate and rhythm. No murmur, gallop or rub.  Lungs: Clear, no rales or wheezes. Equal expansion is noted.   Extremities: Show no edema.  Skin: Warm and dry.  Neurologic: Nonfocal.     Diagnostic Data (reviewed with patient):  Lab Results   Component Value Date    GLUCOSE 115 (H) 07/09/2024    BUN 9 07/09/2024    CREATININE 0.87 07/09/2024    EGFR 92.8 07/09/2024    BCR 10.3 07/09/2024     07/09/2024    K 4.4 07/09/2024    CL 99 07/09/2024    CO2 25.1 07/09/2024    CALCIUM 8.7 07/09/2024    ALBUMIN 4.1 07/09/2024    ALKPHOS 55 07/09/2024    AST 23 07/09/2024    ALT 22 07/09/2024     Lab Results   Component Value Date    WBC 7.79 07/09/2024    RBC 4.44 07/09/2024    HGB 14.6 07/09/2024    HCT 42.7 07/09/2024    MCV 96.2 07/09/2024     07/09/2024         Advance Care Planning   ACP discussion was held with the patient during this visit. Patient has an advance directive (not in EMR), copy requested.       Procedures      Assessment:    ICD-10-CM ICD-9-CM   1. Atrial fibrillation, chronic  I48.20 427.31   2. Essential hypertension  I10 401.9   3. Mixed hyperlipidemia  E78.2 272.2         Plan:  Restart exercising 30 minutes 4 to 5 days/week.  Continue on hydrochlorothiazide 12.5 mg daily for hypertension.   Continue on lisinopril 10 mg daily for hypertension.    Continue on Xarelto 20 mg daily for stroke prophylaxis.    Continue on rosuvastatin 10 mg nightly for hyperlipidemia.   Continue all other current medications.  F/up in 12 months w " Dr. Smith, sooner if needed.      Caitlyn Cardenas MD, [facility]

## 2025-01-29 ENCOUNTER — OFFICE VISIT (OUTPATIENT)
Dept: CARDIOLOGY | Facility: CLINIC | Age: 70
End: 2025-01-29
Payer: MEDICARE

## 2025-01-29 VITALS
DIASTOLIC BLOOD PRESSURE: 66 MMHG | OXYGEN SATURATION: 98 % | WEIGHT: 225.6 LBS | SYSTOLIC BLOOD PRESSURE: 120 MMHG | HEIGHT: 71 IN | HEART RATE: 61 BPM | BODY MASS INDEX: 31.58 KG/M2

## 2025-01-29 DIAGNOSIS — I48.20 ATRIAL FIBRILLATION, CHRONIC: Primary | ICD-10-CM

## 2025-01-29 DIAGNOSIS — E78.2 MIXED HYPERLIPIDEMIA: ICD-10-CM

## 2025-01-29 DIAGNOSIS — I10 ESSENTIAL HYPERTENSION: ICD-10-CM

## 2025-01-29 PROCEDURE — 1159F MED LIST DOCD IN RCRD: CPT | Performed by: INTERNAL MEDICINE

## 2025-01-29 PROCEDURE — 3078F DIAST BP <80 MM HG: CPT | Performed by: INTERNAL MEDICINE

## 2025-01-29 PROCEDURE — 3074F SYST BP LT 130 MM HG: CPT | Performed by: INTERNAL MEDICINE

## 2025-01-29 PROCEDURE — 99214 OFFICE O/P EST MOD 30 MIN: CPT | Performed by: INTERNAL MEDICINE

## 2025-01-29 PROCEDURE — 1160F RVW MEDS BY RX/DR IN RCRD: CPT | Performed by: INTERNAL MEDICINE

## 2025-01-29 NOTE — ASSESSMENT & PLAN NOTE
- Patient has peripheral neuropathy in hands and feet.     - This is his biggest problem.  Hands and feet him.

## 2025-01-29 NOTE — ASSESSMENT & PLAN NOTE
- Patient was diagnosed in 2005, previously in remission after his initial episode with Asacol and 6MP. After he quit smoking in 2020, he had a flare and has not been able to get this under control since. GI is Dr. Corbin.  - I do think his joint pain is related to his UC. He is still not controlled on current regimen   - Current Rx:  Entyvio once a month and 6MP 50mg daily.    - Previous Rx: Asacol and Humira which were ineffective.     - continue taking Entyvio once a month and 6MP 50mg daily.  He does feel Entyvio is helpful.  - He continues to not want to add or change medications at this time.  - He does not want to add medications at this time.  - Could consider tramadol  PRN prednisone tapers.  - Request labs from PCP and Dr. Corbin

## 2025-01-30 ENCOUNTER — OFFICE VISIT (OUTPATIENT)
Age: 70
End: 2025-01-30
Payer: MEDICARE

## 2025-01-30 ENCOUNTER — TELEPHONE (OUTPATIENT)
Age: 70
End: 2025-01-30

## 2025-01-30 VITALS
TEMPERATURE: 97.3 F | DIASTOLIC BLOOD PRESSURE: 66 MMHG | BODY MASS INDEX: 31.92 KG/M2 | HEIGHT: 71 IN | SYSTOLIC BLOOD PRESSURE: 128 MMHG | WEIGHT: 228 LBS | HEART RATE: 59 BPM

## 2025-01-30 DIAGNOSIS — R53.83 OTHER FATIGUE: ICD-10-CM

## 2025-01-30 DIAGNOSIS — R20.0 NUMBNESS AND TINGLING: Chronic | ICD-10-CM

## 2025-01-30 DIAGNOSIS — Z79.899 HIGH RISK MEDICATION USE: ICD-10-CM

## 2025-01-30 DIAGNOSIS — G89.29 CHRONIC PAIN OF MULTIPLE JOINTS: ICD-10-CM

## 2025-01-30 DIAGNOSIS — R20.2 NUMBNESS AND TINGLING: Chronic | ICD-10-CM

## 2025-01-30 DIAGNOSIS — K51.811 OTHER ULCERATIVE COLITIS WITH RECTAL BLEEDING: Primary | ICD-10-CM

## 2025-01-30 DIAGNOSIS — M25.50 CHRONIC PAIN OF MULTIPLE JOINTS: ICD-10-CM

## 2025-01-30 NOTE — PROGRESS NOTES
Office Visit       Date: 01/30/2025   Patient Name: Oskar Jimenes  MRN: [id number]  YOB: 1955    Referring Physician: Keisha Smith DO     Chief Complaint:   Chief Complaint   Patient presents with    Follow-up       History of Present Illness: Oskar Jimenes is a 69 y.o. male who is here today for follow-up of ulcerative colitis with chronic pain of multiple joints.  He is a former patient of Dr. Jeanine Mcneal.  Today he reports feeling same.  He rates his pain 3 out of 10 and has 30 minutes of morning stiffness.  Bleeding prescribe him any medications.      Subjective   Review of Systems:  Review of Systems   HENT:  Positive for postnasal drip, rhinorrhea and sinus pressure.    Musculoskeletal:  Positive for arthralgias and back pain.   Allergic/Immunologic: Positive for environmental allergies and immunocompromised state.   All other systems reviewed and are negative.       Past Medical History:   Past Medical History:   Diagnosis Date    Anemia     Arrhythmia     Atrial fibrillation 2017    Broken nose     toes and coccyx    Clostridium difficile colitis 10/2021    Clotting disorder 2017    Started after taking Xarelto    Colitis     Deep vein thrombosis 10/01/2021    Localized in left arm--now clear.    Hypertension 2010    Mixed hyperlipidemia     Sleep apnea 2021    Stroke 2017       Past Surgical History:   Past Surgical History:   Procedure Laterality Date    APPENDECTOMY      ORIF ZYGOMATIC FRACTURE      TONSILLECTOMY         Family History:   Family History   Problem Relation Age of Onset    Heart failure Mother     Diabetes Mother     Heart disease Father     Heart failure Father     Kidney failure Father     Hypertension Father     Kidney disease Father     Diabetes Brother     Scleroderma Son        Social History:   Social History     Socioeconomic History    Marital status:    Tobacco Use    Smoking status: Former     Current packs/day:  0.00     Average packs/day: 0.5 packs/day for 42.6 years (21.3 ttl pk-yrs)     Types: Cigarettes     Start date: 10/1/1977     Quit date: 2020     Years since quittin.7    Smokeless tobacco: Never   Vaping Use    Vaping status: Never Used   Substance and Sexual Activity    Alcohol use: Yes     Alcohol/week: 2.0 - 3.0 standard drinks of alcohol     Types: 2 - 3 Cans of beer per week     Comment: consumed rarely    Drug use: Never    Sexual activity: Not Currently     Partners: Female       Medications:   Current Outpatient Medications:     ascorbic acid (VITAMIN C) 1000 MG tablet, Take 1 tablet by mouth Daily., Disp: , Rfl:     cetirizine (zyrTEC) 10 MG tablet, Take 1 tablet by mouth Daily., Disp: , Rfl:     Coenzyme Q10 (CO Q 10 PO), Take 300 mg by mouth Daily., Disp: , Rfl:     EPINEPHrine (EPIPEN) 0.3 MG/0.3ML solution auto-injector injection, , Disp: , Rfl:     fluticasone (FLONASE) 50 MCG/ACT nasal spray, Administer 1 spray into the nostril(s) as directed by provider As Needed., Disp: , Rfl:     folic acid (FOLVITE) 1 MG tablet, Take 1 tablet by mouth Daily., Disp: , Rfl:     Glucosamine-Chondroit-Vit C-Mn (GLUCOSAMINE CHONDR 1500 COMPLX PO), Take  by mouth., Disp: , Rfl:     glucosamine-chondroitin 500-400 MG per tablet, Take 1 tablet by mouth 2 (Two) Times a Day., Disp: , Rfl:     hydroCHLOROthiazide (MICROZIDE) 12.5 MG capsule, Take 1 capsule by mouth Every Morning., Disp: , Rfl:     hydrocortisone 2.5 % cream, Apply 1 Application topically to the appropriate area as directed As Needed., Disp: , Rfl:     ipratropium (ATROVENT) 0.06 % nasal spray, Administer 1 spray into the nostril(s) as directed by provider As Needed., Disp: , Rfl:     levocetirizine (XYZAL) 5 MG tablet, Take 1 tablet by mouth., Disp: , Rfl:     lisinopril (PRINIVIL,ZESTRIL) 10 MG tablet, Take 1 tablet by mouth Daily., Disp: , Rfl:     Magnesium 70 MG capsule, Take 70 mg by mouth Daily., Disp: , Rfl:     magnesium oxide (MAG-OX) 400  "MG tablet, Take 1 tablet by mouth Daily., Disp: , Rfl:     mercaptopurine (PURINETHOL) 50 MG chemo tablet, Take 1 tablet by mouth Daily., Disp: , Rfl:     metFORMIN (GLUCOPHAGE) 1000 MG tablet, Take 1 tablet by mouth Daily With Breakfast., Disp: , Rfl:     Polysacch Fe Cmp-Fe Heme Poly (Feosol Bifera) 28 MG tablet, Take 1 tablet by mouth Daily. (Patient taking differently: Take 2 tablets by mouth Daily.), Disp: 30 tablet, Rfl: 0    pyridoxine (VITAMIN B-6) 50 MG tablet, Take 1 tablet by mouth Daily., Disp: , Rfl:     rivaroxaban (XARELTO) 20 MG tablet, Take 1 tablet by mouth Daily., Disp: , Rfl:     rosuvastatin (CRESTOR) 10 MG tablet, Take 1 tablet by mouth Every Night., Disp: , Rfl:     thiamine (VITAMIN B-1) 100 MG tablet  tablet, Take 0.5 tablets by mouth Daily., Disp: , Rfl:     vedolizumab (Entyvio) 300 MG injection, Infuse 5 mL into a venous catheter 1 (One) Time. Infusion every 8 weeks. Last dosage 12/30/2022., Disp: , Rfl:     Allergies: No Known Allergies    I have reviewed and updated the patient's chief complaint, history of present illness, review of systems, past medical history, surgical history, family history, social history, medications and allergy list as appropriate.     Objective    Vital Signs:   Vitals:    01/30/25 1108   BP: 128/66   Pulse: 59   Temp: 97.3 °F (36.3 °C)   Weight: 103 kg (228 lb)   Height: 180.3 cm (71\")   PainSc:   3     Body mass index is 31.8 kg/m².           Physical Exam:  Physical Exam  Vitals reviewed.   Constitutional:       Appearance: Normal appearance.   HENT:      Head: Normocephalic and atraumatic.      Mouth/Throat:      Mouth: Mucous membranes are moist.   Eyes:      Conjunctiva/sclera: Conjunctivae normal.   Cardiovascular:      Rate and Rhythm: Normal rate and regular rhythm.      Pulses: Normal pulses.      Heart sounds: Normal heart sounds.   Pulmonary:      Effort: Pulmonary effort is normal.      Breath sounds: Normal breath sounds.   Abdominal:      " General: Abdomen is protuberant.   Musculoskeletal:         General: Normal range of motion.      Cervical back: Normal range of motion and neck supple.      Comments: OA changes bilateral hands  Crepitus bilateral knees  No soft tissue swelling   Skin:     General: Skin is warm and dry.   Neurological:      General: No focal deficit present.      Mental Status: He is alert and oriented to person, place, and time. Mental status is at baseline.   Psychiatric:         Mood and Affect: Mood normal.         Behavior: Behavior normal.         Thought Content: Thought content normal.         Judgment: Judgment normal.          Results Review:   Imaging Results (Last 24 Hours)       ** No results found for the last 24 hours. **            Procedures    Assessment / Plan    Assessment/Plan:   Diagnoses and all orders for this visit:    1. Other ulcerative colitis with rectal bleeding (Primary)  Assessment & Plan:  - Patient was diagnosed in 2005, previously in remission after his initial episode with Asacol and 6MP. After he quit smoking in 2020, he had a flare and has not been able to get this under control since. GI is Dr. Corbin.  - I do think his joint pain is related to his UC. He is still not controlled on current regimen   - Current Rx:  Entyvio once a month and 6MP 50mg daily.    - Previous Rx: Asacol and Humira which were ineffective.     - continue taking Entyvio once a month and 6MP 50mg daily.  He does feel Entyvio is helpful.  - He continues to not want to add or change medications at this time.  - He does not want to add medications at this time.  - Could consider tramadol  PRN prednisone tapers.  - Request labs from PCP and Dr. Corbin      2. Chronic pain of multiple joints  Assessment & Plan:  - Patient probably has a combination of inflammatory arthritis, related to his ulcerative colitis, as well as secondary osteoarthritis.     - Continue with Tylenol for pain  - See Ulcerative colitis as above      3.  High risk medication use  Assessment & Plan:  He has declined adding another medication at this time      4. Numbness and tingling  Assessment & Plan:  - Patient has peripheral neuropathy in hands and feet.     - This is his biggest problem.  Hands and feet him.      5. Other fatigue  Assessment & Plan:  Improved          Follow Up:   Return in about 6 months (around 7/30/2025) for ELADIA Rojas.        ELADIA Gomes  [facility]

## 2025-07-28 NOTE — ASSESSMENT & PLAN NOTE
- Currently not treated which is concerning and could cause worsening joint pains    He did not tolerate CPAP

## 2025-07-28 NOTE — PROGRESS NOTES
Office Visit       Date: 07/31/2025   Patient Name: Osakr Jimenes  MRN: [id number]  YOB: 1955    Referring Physician: No ref. provider found     Chief Complaint:   Chief Complaint   Patient presents with    Other ulcerative colitis with rectal bleeding       History of Present Illness: Oskar Jimenes is a 70 y.o. male who is here today for follow-up of ulcerative colitis with chronic pain of multiple joints.  He is a former patient of Dr. Jeanine Mcneal.  Today he reports feeling same.  He rates his pain 3 out of 10 and has 30 minutes of morning stiffness.  His global score is 2.5/10.  We do not prescribe him any medications.      Subjective   Review of Systems:  Review of Systems   HENT:  Positive for postnasal drip and rhinorrhea.    Eyes:  Positive for itching.   Respiratory:  Positive for cough.    Musculoskeletal:  Positive for arthralgias and back pain.   Allergic/Immunologic: Positive for environmental allergies.   Hematological:  Bruises/bleeds easily.   All other systems reviewed and are negative.       Past Medical History:   Past Medical History:   Diagnosis Date    Anemia     Arrhythmia     Atrial fibrillation 2017    Broken nose     toes and coccyx    Clostridium difficile colitis 10/2021    Clotting disorder 2017    Started after taking Xarelto    Colitis     Deep vein thrombosis 10/01/2021    Localized in left arm--now clear.    Hypertension 2010    Mixed hyperlipidemia     Sleep apnea 2021    Stroke 2017       Past Surgical History:   Past Surgical History:   Procedure Laterality Date    APPENDECTOMY      ORIF ZYGOMATIC FRACTURE      TONSILLECTOMY         Family History:   Family History   Problem Relation Age of Onset    Heart failure Mother     Diabetes Mother     Heart disease Father     Heart failure Father     Kidney failure Father     Hypertension Father     Kidney disease Father     Diabetes Brother     Scleroderma Son        Social History:    Social History     Socioeconomic History    Marital status:    Tobacco Use    Smoking status: Former     Current packs/day: 0.00     Average packs/day: 0.5 packs/day for 42.6 years (21.3 ttl pk-yrs)     Types: Cigarettes     Start date: 10/1/1977     Quit date: 2020     Years since quittin.2     Passive exposure: Past    Smokeless tobacco: Never   Vaping Use    Vaping status: Never Used   Substance and Sexual Activity    Alcohol use: Yes     Alcohol/week: 2.0 - 3.0 standard drinks of alcohol     Types: 2 - 3 Cans of beer per week     Comment: consumed rarely    Drug use: Never    Sexual activity: Not Currently     Partners: Female       Medications:   Current Outpatient Medications:     ascorbic acid (VITAMIN C) 1000 MG tablet, Take 1 tablet by mouth Daily., Disp: , Rfl:     cetirizine (zyrTEC) 10 MG tablet, Take 1 tablet by mouth Daily., Disp: , Rfl:     Coenzyme Q10 (CO Q 10 PO), Take 300 mg by mouth Daily., Disp: , Rfl:     EPINEPHrine (EPIPEN) 0.3 MG/0.3ML solution auto-injector injection, , Disp: , Rfl:     fluticasone (FLONASE) 50 MCG/ACT nasal spray, Administer 1 spray into the nostril(s) as directed by provider As Needed., Disp: , Rfl:     folic acid (FOLVITE) 1 MG tablet, Take 1 tablet by mouth Daily., Disp: , Rfl:     Glucosamine-Chondroit-Vit C-Mn (GLUCOSAMINE CHONDR 1500 COMPLX PO), Take  by mouth., Disp: , Rfl:     glucosamine-chondroitin 500-400 MG per tablet, Take 1 tablet by mouth 2 (Two) Times a Day., Disp: , Rfl:     hydroCHLOROthiazide (MICROZIDE) 12.5 MG capsule, Take 1 capsule by mouth Every Morning., Disp: , Rfl:     hydrocortisone 2.5 % cream, Apply 1 Application topically to the appropriate area as directed As Needed., Disp: , Rfl:     ipratropium (ATROVENT) 0.06 % nasal spray, Administer 1 spray into the nostril(s) as directed by provider As Needed., Disp: , Rfl:     levocetirizine (XYZAL) 5 MG tablet, Take 1 tablet by mouth., Disp: , Rfl:     lisinopril (PRINIVIL,ZESTRIL)  "10 MG tablet, Take 1 tablet by mouth Daily., Disp: , Rfl:     Magnesium 70 MG capsule, Take 70 mg by mouth Daily., Disp: , Rfl:     magnesium oxide (MAG-OX) 400 MG tablet, Take 1 tablet by mouth Daily., Disp: , Rfl:     mercaptopurine (PURINETHOL) 50 MG chemo tablet, Take 1 tablet by mouth Daily., Disp: , Rfl:     Polysacch Fe Cmp-Fe Heme Poly (Feosol Bifera) 28 MG tablet, Take 1 tablet by mouth Daily. (Patient taking differently: Take 2 tablets by mouth Daily.), Disp: 30 tablet, Rfl: 0    pyridoxine (VITAMIN B-6) 50 MG tablet, Take 1 tablet by mouth Daily., Disp: , Rfl:     rivaroxaban (XARELTO) 20 MG tablet, Take 1 tablet by mouth Daily., Disp: , Rfl:     rosuvastatin (CRESTOR) 10 MG tablet, Take 1 tablet by mouth Every Night., Disp: , Rfl:     thiamine (VITAMIN B-1) 100 MG tablet  tablet, Take 0.5 tablets by mouth Daily., Disp: , Rfl:     vedolizumab (Entyvio) 300 MG injection, Infuse 5 mL into a venous catheter 1 (One) Time. Infusion every 8 weeks. Last dosage 12/30/2022., Disp: , Rfl:     metFORMIN (GLUCOPHAGE) 1000 MG tablet, Take 1 tablet by mouth Daily With Breakfast. (Patient not taking: Reported on 7/31/2025), Disp: , Rfl:     Allergies: No Known Allergies    I have reviewed and updated the patient's chief complaint, history of present illness, review of systems, past medical history, surgical history, family history, social history, medications and allergy list as appropriate.     Objective    Vital Signs:   Vitals:    07/31/25 1046   BP: 122/68   BP Location: Left arm   Patient Position: Sitting   Cuff Size: Adult   Pulse: 57   Temp: 97.8 °F (36.6 °C)   Weight: 108 kg (237 lb 6.4 oz)   Height: 180.3 cm (71\")   PainSc: 3    PainLoc: Generalized       Body mass index is 33.11 kg/m².           Physical Exam:  Physical Exam  Vitals reviewed.   Constitutional:       Appearance: Normal appearance.   HENT:      Head: Normocephalic and atraumatic.      Mouth/Throat:      Mouth: Mucous membranes are moist. "   Eyes:      Conjunctiva/sclera: Conjunctivae normal.   Cardiovascular:      Rate and Rhythm: Normal rate and regular rhythm.      Pulses: Normal pulses.      Heart sounds: Normal heart sounds.   Pulmonary:      Effort: Pulmonary effort is normal.      Breath sounds: Normal breath sounds.   Abdominal:      General: Abdomen is protuberant.   Musculoskeletal:         General: Normal range of motion.      Cervical back: Normal range of motion and neck supple.      Comments: OA changes bilateral hands  Crepitus bilateral knees  No soft tissue swelling  No synovitis   Skin:     General: Skin is warm and dry.   Neurological:      General: No focal deficit present.      Mental Status: He is alert and oriented to person, place, and time. Mental status is at baseline.   Psychiatric:         Mood and Affect: Mood normal.         Behavior: Behavior normal.         Thought Content: Thought content normal.         Judgment: Judgment normal.          Results Review:   Imaging Results (Last 24 Hours)       ** No results found for the last 24 hours. **            Procedures    Assessment / Plan      Assessment & Plan  Other ulcerative colitis with rectal bleeding  - Patient was diagnosed in 2005, previously in remission after his initial episode with Asacol and 6MP. After he quit smoking in 2020, he had a flare and has not been able to get this under control since. GI is Dr. Corbin.  - I do think his joint pain is related to his UC. He is still not controlled on current regimen   - Current Rx:  Entyvio once a month and 6MP 50mg daily.    - Previous Rx: Asacol and Humira which were ineffective.     - continue taking Entyvio once a month and 6MP 25 mg daily.  He does feel Entyvio is helpful.  - He continues to not want to add or change medications at this time.  - Could consider tramadol  PRN prednisone tapers.  - Request labs from PCP and Dr. Corbin  - He will see Dr Corbin next week  - Hands have been sore and stiff.  He has been  playing more golf.        Chronic pain of multiple joints  - Patient probably has a combination of inflammatory arthritis, related to his ulcerative colitis, as well as secondary osteoarthritis.     - Continue with Tylenol for pain  - See Ulcerative colitis as above        High risk medication use  He has declined adding another medication at this time        Numbness and tingling  - Patient has peripheral neuropathy in hands and feet.     - Hands and feet continue to be his worst problem     Sleep apnea, unspecified type  - Currently not treated which is concerning and could cause worsening joint pains    He did not tolerate CPAP          I attest that the documentation was copied from a previous note but is still current and accurate.      Follow Up:   Return in about 6 months (around 1/31/2026) for ELADIA Rojas.        ELADIA Gomes  [facility] [place]

## 2025-07-28 NOTE — ASSESSMENT & PLAN NOTE
- Patient was diagnosed in 2005, previously in remission after his initial episode with Asacol and 6MP. After he quit smoking in 2020, he had a flare and has not been able to get this under control since. GI is Dr. Corbin.  - I do think his joint pain is related to his UC. He is still not controlled on current regimen   - Current Rx:  Entyvio once a month and 6MP 50mg daily.    - Previous Rx: Asacol and Humira which were ineffective.     - continue taking Entyvio once a month and 6MP 25 mg daily.  He does feel Entyvio is helpful.  - He continues to not want to add or change medications at this time.  - Could consider tramadol  PRN prednisone tapers.  - Request labs from PCP and Dr. Corbin  - He will see Dr Corbin next week  - Hands have been sore and stiff.  He has been playing more golf.

## 2025-07-31 ENCOUNTER — OFFICE VISIT (OUTPATIENT)
Age: 70
End: 2025-07-31
Payer: MEDICARE

## 2025-07-31 VITALS
SYSTOLIC BLOOD PRESSURE: 122 MMHG | TEMPERATURE: 97.8 F | DIASTOLIC BLOOD PRESSURE: 68 MMHG | HEIGHT: 71 IN | BODY MASS INDEX: 33.23 KG/M2 | HEART RATE: 57 BPM | WEIGHT: 237.4 LBS

## 2025-07-31 DIAGNOSIS — R20.2 NUMBNESS AND TINGLING: ICD-10-CM

## 2025-07-31 DIAGNOSIS — G47.30 SLEEP APNEA, UNSPECIFIED TYPE: ICD-10-CM

## 2025-07-31 DIAGNOSIS — G89.29 CHRONIC PAIN OF MULTIPLE JOINTS: ICD-10-CM

## 2025-07-31 DIAGNOSIS — Z79.899 HIGH RISK MEDICATION USE: ICD-10-CM

## 2025-07-31 DIAGNOSIS — K51.811 OTHER ULCERATIVE COLITIS WITH RECTAL BLEEDING: Primary | ICD-10-CM

## 2025-07-31 DIAGNOSIS — M25.50 CHRONIC PAIN OF MULTIPLE JOINTS: ICD-10-CM

## 2025-07-31 DIAGNOSIS — R20.0 NUMBNESS AND TINGLING: ICD-10-CM
